# Patient Record
Sex: MALE | Race: WHITE | NOT HISPANIC OR LATINO | Employment: FULL TIME | URBAN - METROPOLITAN AREA
[De-identification: names, ages, dates, MRNs, and addresses within clinical notes are randomized per-mention and may not be internally consistent; named-entity substitution may affect disease eponyms.]

---

## 2019-03-28 ENCOUNTER — OFFICE VISIT (OUTPATIENT)
Dept: URGENT CARE | Facility: CLINIC | Age: 34
End: 2019-03-28
Payer: COMMERCIAL

## 2019-03-28 VITALS
BODY MASS INDEX: 30.8 KG/M2 | WEIGHT: 220 LBS | DIASTOLIC BLOOD PRESSURE: 124 MMHG | HEART RATE: 94 BPM | TEMPERATURE: 98 F | RESPIRATION RATE: 16 BRPM | HEIGHT: 71 IN | SYSTOLIC BLOOD PRESSURE: 186 MMHG

## 2019-03-28 DIAGNOSIS — Z02.83 ENCOUNTER FOR DRUG SCREENING: ICD-10-CM

## 2019-03-28 DIAGNOSIS — I10 ELEVATED BLOOD PRESSURE READING IN OFFICE WITH DIAGNOSIS OF HYPERTENSION: ICD-10-CM

## 2019-03-28 DIAGNOSIS — S00.83XA CONTUSION OF FOREHEAD, INITIAL ENCOUNTER: Primary | ICD-10-CM

## 2019-03-28 LAB — BREATH ALCOHOL: 0

## 2019-03-28 PROCEDURE — 82075 ASSAY OF BREATH ETHANOL: CPT | Mod: S$GLB,,, | Performed by: PHYSICIAN ASSISTANT

## 2019-03-28 PROCEDURE — 82075 POCT BREATH ALCOHOL TEST: ICD-10-PCS | Mod: S$GLB,,, | Performed by: PHYSICIAN ASSISTANT

## 2019-03-28 PROCEDURE — 99203 PR OFFICE/OUTPT VISIT, NEW, LEVL III, 30-44 MIN: ICD-10-PCS | Mod: S$GLB,,, | Performed by: PHYSICIAN ASSISTANT

## 2019-03-28 PROCEDURE — 99203 OFFICE O/P NEW LOW 30 MIN: CPT | Mod: S$GLB,,, | Performed by: PHYSICIAN ASSISTANT

## 2019-03-28 NOTE — LETTER
[facility]  [facility] [facility] [place], [address]  [facility] [address]  Phone: [phone]  Fax: [phone]  [facility]: [email or web address]    Pt Name: Charly Duran Date: 03/28/2019   Employee ID: [id number] Date of Treatment: 03/28/2019   Company: [employer]      Appointment Time:  Arrived: 11:55 AM   Provider: Graham Hatch PA-C Time Out:2:19 PM     Office Treatment:   1. Contusion of forehead, initial encounter    2. Encounter for drug screening    3. Elevated blood pressure reading in office with diagnosis of hypertension               Restrictions: None, Regular Duty     KD

## 2019-03-28 NOTE — PATIENT INSTRUCTIONS
Facial Contusion  A contusion is another word for a bruise. It happens when small blood vessels break open and leak blood into the nearby area. A facial contusion can result from a bump, hit, or fall. This may happen during sports or an accident. Symptoms of a contusion often include changes in skin color (bruising), swelling, and pain.   The swelling from the contusion should decrease in a few days. Bruising and pain may take several weeks to go away.   Home care  · If you have been prescribed medicines for pain, take them as directed.  · To help reduce swelling and pain, wrap a cold pack or bag of frozen peas in a thin towel. Put it on the injured area for up to 20 minutes. Do this a few times a day until the swelling goes down.   · If you have scrapes or cuts on your face requiring stiches or other closures, care for them as directed.  · For the next 24 hours (or longer if instructed):  ¨ Dont drink alcohol, or use sedatives or medicines that make you sleepy.  ¨ Dont drive or operate machinery.  ¨ Avoid doing anything strenuous. Dont lift or strain.  ¨ Do not return to sports or other activity that could result in another head injury.  Note about concussion  Because the injury was to your head, it is possible that a concussion (mild brain injury) could result. You don't have signs of a concussion at this time. But symptoms can show up later. Be alert for signs and symptoms of a concussion. Seek emergency medical care if any of these develop over the next hours to days:  · Headache  · Nausea or vomiting  · Dizziness  · Sensitivity to light or noise  · Unusual sleepiness or grogginess  · Trouble falling asleep  · Personality changes  · Vision changes  · Memory loss  · Confusion  · Trouble walking or clumsiness  · Loss of consciousness (even for a short time)  · Inability to be awakened   Follow-up care  Follow up with your healthcare provider or our staff as directed.  When to seek medical advice  Call your  healthcare provider right away if any of these occur:  · Swelling or pain that gets worse, not better  · New swelling or pain  · Warmth or drainage from the swollen area or from cuts or scrapes  · Fluid drainage or bleeding from the nose or ears  · Fever of 100.4ºF (38ºC) or higher, or as directed by your healthcare provider  Date Last Reviewed: 5/7/2015  © 8178-2853 [employer]. [address] [place] [address], [place] [id number]. All rights reserved. This information is not intended as a substitute for professional medical care. Always follow your healthcare professional's instructions.

## 2019-03-28 NOTE — PROGRESS NOTES
Subjective:       Patient ID: Charly Pires is a 33 y.o. male.    Chief Complaint: Head Injury and Drug / Alcohol Assessment    Pt is being seen today for a head injury from 03/28/19.  He hit his head on a dumpster door.  He has a headache, feels tired, double vision, and soreness.  His pain level today 4/10.  Vision (corrected)  Both eyes:  20/20  Left eye:  20/25 Right eye:  20/25.  KD    Patient endorses mild headache, denies loss of consciousness, tinnitus, speech problems, focal weakness.    Review of Systems   Constitution: Negative for malaise/fatigue.   HENT: Negative for hearing loss and tinnitus.    Eyes: Positive for blurred vision and double vision. Negative for visual disturbance.   Cardiovascular: Negative for chest pain.   Respiratory: Negative for shortness of breath.    Hematologic/Lymphatic: Negative for bleeding problem.   Skin: Negative for color change and poor wound healing.   Musculoskeletal: Negative for back pain and neck pain.   Gastrointestinal: Negative for abdominal pain, nausea and vomiting.   Neurological: Positive for dizziness and headaches. Negative for difficulty with concentration, disturbances in coordination, focal weakness, seizures and weakness.   Psychiatric/Behavioral: Negative for altered mental status (Denies LOC) and memory loss.   All other systems reviewed and are negative.      Objective:      Physical Exam   Constitutional: He is oriented to person, place, and time. He appears well-developed and well-nourished. He is cooperative.  Non-toxic appearance. He does not appear ill. No distress.   HENT:   Head: Normocephalic. Head is with contusion (Mid forehead). Head is without raccoon's eyes, without Childress's sign and without abrasion.       Right Ear: Hearing, tympanic membrane, external ear and ear canal normal.   Left Ear: Hearing, tympanic membrane, external ear and ear canal normal.   Nose: Nose normal. No mucosal edema, rhinorrhea or nasal deformity. No  epistaxis. Right sinus exhibits no maxillary sinus tenderness and no frontal sinus tenderness. Left sinus exhibits no maxillary sinus tenderness and no frontal sinus tenderness.   Mouth/Throat: Uvula is midline, oropharynx is clear and moist and mucous membranes are normal. No trismus in the jaw. Normal dentition. No uvula swelling. No posterior oropharyngeal erythema.   No temporal TTP   Eyes: Pupils are equal, round, and reactive to light. Conjunctivae, EOM and lids are normal. No scleral icterus.   Sclera clear bilat   Neck: Trachea normal, normal range of motion, full passive range of motion without pain and phonation normal. Neck supple. No neck rigidity.   Cardiovascular: Normal rate, regular rhythm, normal heart sounds, intact distal pulses and normal pulses.   Pulmonary/Chest: Effort normal and breath sounds normal. No respiratory distress.   Abdominal: Soft. Normal appearance and bowel sounds are normal. He exhibits no distension. There is no tenderness.   Musculoskeletal: Normal range of motion. He exhibits no edema or deformity.   Neurological: He is alert and oriented to person, place, and time. He has normal strength. No cranial nerve deficit. He exhibits normal muscle tone. He displays a negative Romberg sign. Coordination (Rapid alternating movements, finger to nose normal bilaterally) and gait ( heel, toe, tandem gait normal) normal. GCS eye subscore is 4. GCS verbal subscore is 5. GCS motor subscore is 6.   Wall head CT score = 0   Skin: Skin is warm, dry and intact. He is not diaphoretic. No pallor.   Psychiatric: He has a normal mood and affect. His speech is normal and behavior is normal. Judgment and thought content normal. Cognition and memory are normal.   Nursing note and vitals reviewed.      Assessment:       1. Contusion of forehead, initial encounter    2. Encounter for drug screening    3. Elevated blood pressure reading in office with diagnosis of hypertension        Plan:                 Restrictions: Regular Duty  Follow up if symptoms worsen or fail to improve.      Discussed follow-up with primary care for blood pressure management.  Patient was given information for free clinics in the [facility] area.      Patient Instructions       Facial Contusion  A contusion is another word for a bruise. It happens when small blood vessels break open and leak blood into the nearby area. A facial contusion can result from a bump, hit, or fall. This may happen during sports or an accident. Symptoms of a contusion often include changes in skin color (bruising), swelling, and pain.   The swelling from the contusion should decrease in a few days. Bruising and pain may take several weeks to go away.   Home care  · If you have been prescribed medicines for pain, take them as directed.  · To help reduce swelling and pain, wrap a cold pack or bag of frozen peas in a thin towel. Put it on the injured area for up to 20 minutes. Do this a few times a day until the swelling goes down.   · If you have scrapes or cuts on your face requiring stiches or other closures, care for them as directed.  · For the next 24 hours (or longer if instructed):  ¨ Dont drink alcohol, or use sedatives or medicines that make you sleepy.  ¨ Dont drive or operate machinery.  ¨ Avoid doing anything strenuous. Dont lift or strain.  ¨ Do not return to sports or other activity that could result in another head injury.  Note about concussion  Because the injury was to your head, it is possible that a concussion (mild brain injury) could result. You don't have signs of a concussion at this time. But symptoms can show up later. Be alert for signs and symptoms of a concussion. Seek emergency medical care if any of these develop over the next hours to days:  · Headache  · Nausea or vomiting  · Dizziness  · Sensitivity to light or noise  · Unusual sleepiness or grogginess  · Trouble falling asleep  · Personality changes  · Vision changes  · Memory  loss  · Confusion  · Trouble walking or clumsiness  · Loss of consciousness (even for a short time)  · Inability to be awakened   Follow-up care  Follow up with your healthcare provider or our staff as directed.  When to seek medical advice  Call your healthcare provider right away if any of these occur:  · Swelling or pain that gets worse, not better  · New swelling or pain  · Warmth or drainage from the swollen area or from cuts or scrapes  · Fluid drainage or bleeding from the nose or ears  · Fever of 100.4ºF (38ºC) or higher, or as directed by your healthcare provider  Date Last Reviewed: 5/7/2015  © 1893-0454 [employer]. [address] [place] [address], [place] [address]. All rights reserved. This information is not intended as a substitute for professional medical care. Always follow your healthcare professional's instructions.

## 2021-10-08 ENCOUNTER — HOSPITAL ENCOUNTER (EMERGENCY)
Facility: HOSPITAL | Age: 36
Discharge: HOME | End: 2021-10-08
Attending: EMERGENCY MEDICINE
Payer: COMMERCIAL

## 2021-10-08 VITALS
SYSTOLIC BLOOD PRESSURE: 143 MMHG | HEART RATE: 97 BPM | BODY MASS INDEX: 29.4 KG/M2 | DIASTOLIC BLOOD PRESSURE: 92 MMHG | RESPIRATION RATE: 17 BRPM | HEIGHT: 71 IN | WEIGHT: 210 LBS | OXYGEN SATURATION: 99 % | TEMPERATURE: 98 F

## 2021-10-08 DIAGNOSIS — I10 HYPERTENSION: ICD-10-CM

## 2021-10-08 DIAGNOSIS — I10 HYPERTENSION, UNSPECIFIED TYPE: Primary | ICD-10-CM

## 2021-10-08 LAB
ALBUMIN SERPL BCP-MCNC: 4.3 G/DL (ref 3.5–5.2)
ALP SERPL-CCNC: 55 U/L (ref 55–135)
ALT SERPL W/O P-5'-P-CCNC: 28 U/L (ref 10–44)
ANION GAP SERPL CALC-SCNC: 7 MMOL/L (ref 8–16)
AST SERPL-CCNC: 18 U/L (ref 10–40)
BASOPHILS # BLD AUTO: 0.06 K/UL (ref 0–0.2)
BASOPHILS NFR BLD: 0.8 % (ref 0–1.9)
BILIRUB SERPL-MCNC: 1.3 MG/DL (ref 0.1–1)
BUN SERPL-MCNC: 20 MG/DL (ref 6–20)
CALCIUM SERPL-MCNC: 9.2 MG/DL (ref 8.7–10.5)
CHLORIDE SERPL-SCNC: 101 MMOL/L (ref 95–110)
CO2 SERPL-SCNC: 29 MMOL/L (ref 23–29)
CREAT SERPL-MCNC: 1 MG/DL (ref 0.5–1.4)
D DIMER PPP IA.FEU-MCNC: <0.27 UG/ML FEU
DIFFERENTIAL METHOD: ABNORMAL
EOSINOPHIL # BLD AUTO: 0.1 K/UL (ref 0–0.5)
EOSINOPHIL NFR BLD: 1.1 % (ref 0–8)
ERYTHROCYTE [DISTWIDTH] IN BLOOD BY AUTOMATED COUNT: 11.9 % (ref 11.5–14.5)
EST. GFR  (AFRICAN AMERICAN): >60 ML/MIN/1.73 M^2
EST. GFR  (NON AFRICAN AMERICAN): >60 ML/MIN/1.73 M^2
GLUCOSE SERPL-MCNC: 124 MG/DL (ref 70–110)
HCT VFR BLD AUTO: 38.5 % (ref 40–54)
HGB BLD-MCNC: 13.5 G/DL (ref 14–18)
IMM GRANULOCYTES # BLD AUTO: 0.03 K/UL (ref 0–0.04)
IMM GRANULOCYTES NFR BLD AUTO: 0.4 % (ref 0–0.5)
LYMPHOCYTES # BLD AUTO: 1.7 K/UL (ref 1–4.8)
LYMPHOCYTES NFR BLD: 23.4 % (ref 18–48)
MCH RBC QN AUTO: 32 PG (ref 27–31)
MCHC RBC AUTO-ENTMCNC: 35.1 G/DL (ref 32–36)
MCV RBC AUTO: 91 FL (ref 82–98)
MONOCYTES # BLD AUTO: 0.5 K/UL (ref 0.3–1)
MONOCYTES NFR BLD: 6.8 % (ref 4–15)
NEUTROPHILS # BLD AUTO: 4.9 K/UL (ref 1.8–7.7)
NEUTROPHILS NFR BLD: 67.5 % (ref 38–73)
NRBC BLD-RTO: 0 /100 WBC
PLATELET # BLD AUTO: 229 K/UL (ref 150–450)
PMV BLD AUTO: 10.5 FL (ref 9.2–12.9)
POTASSIUM SERPL-SCNC: 3.8 MMOL/L (ref 3.5–5.1)
PROT SERPL-MCNC: 6.7 G/DL (ref 6–8.4)
RBC # BLD AUTO: 4.22 M/UL (ref 4.6–6.2)
SODIUM SERPL-SCNC: 137 MMOL/L (ref 136–145)
TROPONIN I SERPL DL<=0.01 NG/ML-MCNC: <0.03 NG/ML
TSH SERPL DL<=0.005 MIU/L-ACNC: 0.84 UIU/ML (ref 0.34–5.6)
WBC # BLD AUTO: 7.22 K/UL (ref 3.9–12.7)

## 2021-10-08 PROCEDURE — 84443 ASSAY THYROID STIM HORMONE: CPT | Performed by: EMERGENCY MEDICINE

## 2021-10-08 PROCEDURE — 85379 FIBRIN DEGRADATION QUANT: CPT | Performed by: EMERGENCY MEDICINE

## 2021-10-08 PROCEDURE — 93005 ELECTROCARDIOGRAM TRACING: CPT | Performed by: INTERNAL MEDICINE

## 2021-10-08 PROCEDURE — 85025 COMPLETE CBC W/AUTO DIFF WBC: CPT | Performed by: EMERGENCY MEDICINE

## 2021-10-08 PROCEDURE — 99284 EMERGENCY DEPT VISIT MOD MDM: CPT | Mod: 25

## 2021-10-08 PROCEDURE — 80053 COMPREHEN METABOLIC PANEL: CPT | Performed by: EMERGENCY MEDICINE

## 2021-10-08 PROCEDURE — 93010 EKG 12-LEAD: ICD-10-PCS | Mod: ,,, | Performed by: INTERNAL MEDICINE

## 2021-10-08 PROCEDURE — 84484 ASSAY OF TROPONIN QUANT: CPT | Performed by: EMERGENCY MEDICINE

## 2021-10-08 PROCEDURE — 93010 ELECTROCARDIOGRAM REPORT: CPT | Mod: ,,, | Performed by: INTERNAL MEDICINE

## 2021-10-08 RX ORDER — HYDROCHLOROTHIAZIDE 25 MG/1
25 TABLET ORAL DAILY
Qty: 30 TABLET | Refills: 0 | Status: SHIPPED | OUTPATIENT
Start: 2021-10-08 | End: 2021-10-11 | Stop reason: SDUPTHER

## 2021-10-09 ENCOUNTER — HOSPITAL ENCOUNTER (EMERGENCY)
Facility: HOSPITAL | Age: 36
Discharge: HOME | End: 2021-10-09
Attending: EMERGENCY MEDICINE
Payer: COMMERCIAL

## 2021-10-09 VITALS
WEIGHT: 210 LBS | OXYGEN SATURATION: 98 % | TEMPERATURE: 99 F | HEIGHT: 71 IN | SYSTOLIC BLOOD PRESSURE: 177 MMHG | BODY MASS INDEX: 29.4 KG/M2 | RESPIRATION RATE: 16 BRPM | HEART RATE: 100 BPM | DIASTOLIC BLOOD PRESSURE: 112 MMHG

## 2021-10-09 DIAGNOSIS — I10 HYPERTENSION: Primary | ICD-10-CM

## 2021-10-09 DIAGNOSIS — F41.9 ANXIETY: ICD-10-CM

## 2021-10-09 PROCEDURE — 93005 ELECTROCARDIOGRAM TRACING: CPT | Performed by: INTERNAL MEDICINE

## 2021-10-09 PROCEDURE — 93010 EKG 12-LEAD: ICD-10-PCS | Mod: ,,, | Performed by: INTERNAL MEDICINE

## 2021-10-09 PROCEDURE — 93010 ELECTROCARDIOGRAM REPORT: CPT | Mod: ,,, | Performed by: INTERNAL MEDICINE

## 2021-10-09 PROCEDURE — 99282 EMERGENCY DEPT VISIT SF MDM: CPT

## 2021-10-09 RX ORDER — HYDROXYZINE PAMOATE 25 MG/1
25 CAPSULE ORAL EVERY 8 HOURS PRN
Qty: 20 CAPSULE | Refills: 0 | Status: SHIPPED | OUTPATIENT
Start: 2021-10-09 | End: 2021-10-19

## 2021-10-11 ENCOUNTER — TELEPHONE (OUTPATIENT)
Dept: FAMILY MEDICINE | Facility: CLINIC | Age: 36
End: 2021-10-11

## 2021-10-11 ENCOUNTER — OFFICE VISIT (OUTPATIENT)
Dept: FAMILY MEDICINE | Facility: CLINIC | Age: 36
End: 2021-10-11
Payer: COMMERCIAL

## 2021-10-11 VITALS
WEIGHT: 207.69 LBS | TEMPERATURE: 99 F | HEART RATE: 104 BPM | RESPIRATION RATE: 18 BRPM | OXYGEN SATURATION: 96 % | HEIGHT: 71 IN | BODY MASS INDEX: 29.08 KG/M2 | DIASTOLIC BLOOD PRESSURE: 62 MMHG | SYSTOLIC BLOOD PRESSURE: 99 MMHG

## 2021-10-11 DIAGNOSIS — G25.81 RLS (RESTLESS LEGS SYNDROME): ICD-10-CM

## 2021-10-11 DIAGNOSIS — G47.33 OBSTRUCTIVE SLEEP APNEA SYNDROME: ICD-10-CM

## 2021-10-11 DIAGNOSIS — R17 ELEVATED BILIRUBIN: ICD-10-CM

## 2021-10-11 DIAGNOSIS — R42 DIZZINESS: Primary | ICD-10-CM

## 2021-10-11 DIAGNOSIS — I10 ESSENTIAL HYPERTENSION: ICD-10-CM

## 2021-10-11 DIAGNOSIS — R00.0 TACHYCARDIA: ICD-10-CM

## 2021-10-11 DIAGNOSIS — F41.9 ANXIETY: ICD-10-CM

## 2021-10-11 DIAGNOSIS — D64.9 ANEMIA, UNSPECIFIED TYPE: ICD-10-CM

## 2021-10-11 DIAGNOSIS — Z11.4 SCREENING FOR HIV WITHOUT PRESENCE OF RISK FACTORS: ICD-10-CM

## 2021-10-11 DIAGNOSIS — Z13.6 ENCOUNTER FOR LIPID SCREENING FOR CARDIOVASCULAR DISEASE: ICD-10-CM

## 2021-10-11 DIAGNOSIS — Z13.220 ENCOUNTER FOR LIPID SCREENING FOR CARDIOVASCULAR DISEASE: ICD-10-CM

## 2021-10-11 LAB
EKG 12-LEAD: NORMAL
GLUCOSE SERPL-MCNC: 138 MG/DL (ref 70–110)
HBA1C MFR BLD: 5.6 %
PR INTERVAL: NORMAL
PRT AXES: NORMAL
QRS DURATION: NORMAL
QT/QTC: NORMAL
VENTRICULAR RATE: NORMAL

## 2021-10-11 PROCEDURE — 3044F HG A1C LEVEL LT 7.0%: CPT | Mod: S$GLB,,, | Performed by: NURSE PRACTITIONER

## 2021-10-11 PROCEDURE — 3078F DIAST BP <80 MM HG: CPT | Mod: S$GLB,,, | Performed by: NURSE PRACTITIONER

## 2021-10-11 PROCEDURE — 3008F BODY MASS INDEX DOCD: CPT | Mod: S$GLB,,, | Performed by: NURSE PRACTITIONER

## 2021-10-11 PROCEDURE — 3044F PR MOST RECENT HEMOGLOBIN A1C LEVEL <7.0%: ICD-10-PCS | Mod: S$GLB,,, | Performed by: NURSE PRACTITIONER

## 2021-10-11 PROCEDURE — 3078F PR MOST RECENT DIASTOLIC BLOOD PRESSURE < 80 MM HG: ICD-10-PCS | Mod: S$GLB,,, | Performed by: NURSE PRACTITIONER

## 2021-10-11 PROCEDURE — 93000 ELECTROCARDIOGRAM COMPLETE: CPT | Mod: S$GLB,,, | Performed by: NURSE PRACTITIONER

## 2021-10-11 PROCEDURE — 1160F PR REVIEW ALL MEDS BY PRESCRIBER/CLIN PHARMACIST DOCUMENTED: ICD-10-PCS | Mod: S$GLB,,, | Performed by: NURSE PRACTITIONER

## 2021-10-11 PROCEDURE — 93000 POCT EKG 12-LEAD: ICD-10-PCS | Mod: S$GLB,,, | Performed by: NURSE PRACTITIONER

## 2021-10-11 PROCEDURE — 99204 PR OFFICE/OUTPT VISIT, NEW, LEVL IV, 45-59 MIN: ICD-10-PCS | Mod: 25,S$GLB,, | Performed by: NURSE PRACTITIONER

## 2021-10-11 PROCEDURE — 82962 GLUCOSE BLOOD TEST: CPT | Mod: S$GLB,,, | Performed by: NURSE PRACTITIONER

## 2021-10-11 PROCEDURE — 3074F SYST BP LT 130 MM HG: CPT | Mod: S$GLB,,, | Performed by: NURSE PRACTITIONER

## 2021-10-11 PROCEDURE — 3074F PR MOST RECENT SYSTOLIC BLOOD PRESSURE < 130 MM HG: ICD-10-PCS | Mod: S$GLB,,, | Performed by: NURSE PRACTITIONER

## 2021-10-11 PROCEDURE — 83036 HEMOGLOBIN GLYCOSYLATED A1C: CPT | Mod: QW,S$GLB,, | Performed by: NURSE PRACTITIONER

## 2021-10-11 PROCEDURE — 3008F PR BODY MASS INDEX (BMI) DOCUMENTED: ICD-10-PCS | Mod: S$GLB,,, | Performed by: NURSE PRACTITIONER

## 2021-10-11 PROCEDURE — 83036 POCT HEMOGLOBIN A1C: ICD-10-PCS | Mod: QW,S$GLB,, | Performed by: NURSE PRACTITIONER

## 2021-10-11 PROCEDURE — 82962 POCT GLUCOSE, HAND-HELD DEVICE: ICD-10-PCS | Mod: S$GLB,,, | Performed by: NURSE PRACTITIONER

## 2021-10-11 PROCEDURE — 99204 OFFICE O/P NEW MOD 45 MIN: CPT | Mod: 25,S$GLB,, | Performed by: NURSE PRACTITIONER

## 2021-10-11 PROCEDURE — 1160F RVW MEDS BY RX/DR IN RCRD: CPT | Mod: S$GLB,,, | Performed by: NURSE PRACTITIONER

## 2021-10-11 RX ORDER — HYDROCHLOROTHIAZIDE 25 MG/1
25 TABLET ORAL DAILY
Qty: 90 TABLET | Refills: 1 | Status: SHIPPED | OUTPATIENT
Start: 2021-10-11 | End: 2022-10-11

## 2021-10-11 RX ORDER — PRAMIPEXOLE DIHYDROCHLORIDE 0.12 MG/1
0.12 TABLET ORAL NIGHTLY
Qty: 30 TABLET | Refills: 1 | Status: SHIPPED | OUTPATIENT
Start: 2021-10-11 | End: 2022-10-11

## 2021-10-11 RX ORDER — METOPROLOL SUCCINATE 25 MG/1
25 TABLET, EXTENDED RELEASE ORAL NIGHTLY
Qty: 30 TABLET | Refills: 1 | Status: SHIPPED | OUTPATIENT
Start: 2021-10-11 | End: 2021-10-11

## 2021-10-12 ENCOUNTER — TELEPHONE (OUTPATIENT)
Dept: PSYCHIATRY | Facility: CLINIC | Age: 36
End: 2021-10-12

## 2021-10-13 ENCOUNTER — PATIENT MESSAGE (OUTPATIENT)
Dept: FAMILY MEDICINE | Facility: CLINIC | Age: 36
End: 2021-10-13
Payer: COMMERCIAL

## 2021-10-13 ENCOUNTER — TELEPHONE (OUTPATIENT)
Dept: FAMILY MEDICINE | Facility: CLINIC | Age: 36
End: 2021-10-13

## 2021-10-13 LAB
ALBUMIN SERPL-MCNC: 4.7 G/DL (ref 3.6–5.1)
ALBUMIN/CREAT UR: 14 MCG/MG CREAT
ALBUMIN/GLOB SERPL: 2.1 (CALC) (ref 1–2.5)
ALP SERPL-CCNC: 71 U/L (ref 36–130)
ALT SERPL-CCNC: 27 U/L (ref 9–46)
AST SERPL-CCNC: 20 U/L (ref 10–40)
BASOPHILS # BLD AUTO: 79 CELLS/UL (ref 0–200)
BASOPHILS NFR BLD AUTO: 0.9 %
BILIRUB DIRECT SERPL-MCNC: 0.1 MG/DL
BILIRUB INDIRECT SERPL-MCNC: 0.7 MG/DL (CALC) (ref 0.2–1.2)
BILIRUB SERPL-MCNC: 0.8 MG/DL (ref 0.2–1.2)
CHOLEST SERPL-MCNC: 164 MG/DL
CHOLEST/HDLC SERPL: 4.8 (CALC)
COMMENT: NORMAL
CREAT UR-MCNC: 128 MG/DL (ref 20–320)
EOSINOPHIL # BLD AUTO: 132 CELLS/UL (ref 15–500)
EOSINOPHIL NFR BLD AUTO: 1.5 %
ERYTHROCYTE [DISTWIDTH] IN BLOOD BY AUTOMATED COUNT: 12 % (ref 11–15)
FERRITIN SERPL-MCNC: 193 NG/ML (ref 38–380)
GLOBULIN SER CALC-MCNC: 2.2 G/DL (CALC) (ref 1.9–3.7)
GLUCOSE P FAST SERPL-MCNC: 130 MG/DL (ref 65–99)
HAV IGM SERPL QL IA: NORMAL
HBA1C MFR BLD: 5.3 % OF TOTAL HGB
HBV CORE IGM SERPL QL IA: NORMAL
HBV SURFACE AG SERPL QL IA: NORMAL
HCT VFR BLD AUTO: 44.8 % (ref 38.5–50)
HCV AB S/CO SERPL IA: 0.01
HCV AB SERPL QL IA: NORMAL
HDLC SERPL-MCNC: 34 MG/DL
HGB BLD-MCNC: 15 G/DL (ref 13.2–17.1)
HIV 1+2 AB+HIV1 P24 AG SERPL QL IA: NORMAL
IRON SATN MFR SERPL: 41 % (CALC) (ref 20–48)
IRON SERPL-MCNC: 153 MCG/DL (ref 50–180)
LDLC SERPL CALC-MCNC: 87 MG/DL (CALC)
LYMPHOCYTES # BLD AUTO: 3758 CELLS/UL (ref 850–3900)
LYMPHOCYTES NFR BLD AUTO: 42.7 %
MCH RBC QN AUTO: 31.3 PG (ref 27–33)
MCHC RBC AUTO-ENTMCNC: 33.5 G/DL (ref 32–36)
MCV RBC AUTO: 93.3 FL (ref 80–100)
MICROALBUMIN UR-MCNC: 1.8 MG/DL
MONOCYTES # BLD AUTO: 572 CELLS/UL (ref 200–950)
MONOCYTES NFR BLD AUTO: 6.5 %
NEUTROPHILS # BLD AUTO: 4259 CELLS/UL (ref 1500–7800)
NEUTROPHILS NFR BLD AUTO: 48.4 %
NONHDLC SERPL-MCNC: 130 MG/DL (CALC)
PLATELET # BLD AUTO: 263 THOUSAND/UL (ref 140–400)
PMV BLD REES-ECKER: 10.8 FL (ref 7.5–12.5)
PROT SERPL-MCNC: 6.9 G/DL (ref 6.1–8.1)
RBC # BLD AUTO: 4.8 MILLION/UL (ref 4.2–5.8)
TIBC SERPL-MCNC: 371 MCG/DL (CALC) (ref 250–425)
TRIGL SERPL-MCNC: 319 MG/DL
VIT B12 SERPL-MCNC: 664 PG/ML (ref 200–1100)
WBC # BLD AUTO: 8.8 THOUSAND/UL (ref 3.8–10.8)

## 2021-10-20 ENCOUNTER — TELEPHONE (OUTPATIENT)
Dept: PSYCHIATRY | Facility: CLINIC | Age: 36
End: 2021-10-20

## 2021-11-04 DIAGNOSIS — G47.33 OSA (OBSTRUCTIVE SLEEP APNEA): Primary | ICD-10-CM

## 2021-11-04 DIAGNOSIS — R06.83 SNORING: ICD-10-CM

## 2021-11-04 DIAGNOSIS — G47.19 EXCESSIVE DAYTIME SLEEPINESS: ICD-10-CM

## 2021-11-04 DIAGNOSIS — R53.83 LOSS OF ENERGY: ICD-10-CM

## 2021-11-04 DIAGNOSIS — I10 HYPERTENSION: ICD-10-CM

## 2021-11-08 ENCOUNTER — PATIENT MESSAGE (OUTPATIENT)
Dept: FAMILY MEDICINE | Facility: CLINIC | Age: 36
End: 2021-11-08
Payer: COMMERCIAL

## 2021-11-08 ENCOUNTER — TELEPHONE (OUTPATIENT)
Dept: PSYCHIATRY | Facility: CLINIC | Age: 36
End: 2021-11-08
Payer: COMMERCIAL

## 2021-11-16 ENCOUNTER — PROCEDURE VISIT (OUTPATIENT)
Dept: SLEEP MEDICINE | Facility: HOSPITAL | Age: 36
End: 2021-11-16
Attending: INTERNAL MEDICINE
Payer: COMMERCIAL

## 2021-11-16 DIAGNOSIS — G47.19 EXCESSIVE DAYTIME SLEEPINESS: ICD-10-CM

## 2021-11-16 DIAGNOSIS — R06.83 SNORING: ICD-10-CM

## 2021-11-16 DIAGNOSIS — R53.83 LOSS OF ENERGY: ICD-10-CM

## 2021-11-16 DIAGNOSIS — I10 HYPERTENSION: ICD-10-CM

## 2021-11-16 DIAGNOSIS — G47.33 OSA (OBSTRUCTIVE SLEEP APNEA): ICD-10-CM

## 2021-11-16 PROCEDURE — 95806 SLEEP STUDY UNATT&RESP EFFT: CPT

## 2021-12-06 ENCOUNTER — OFFICE VISIT (OUTPATIENT)
Dept: PSYCHIATRY | Facility: CLINIC | Age: 36
End: 2021-12-06
Payer: COMMERCIAL

## 2021-12-06 VITALS
BODY MASS INDEX: 30.88 KG/M2 | SYSTOLIC BLOOD PRESSURE: 182 MMHG | WEIGHT: 220.56 LBS | HEIGHT: 71 IN | DIASTOLIC BLOOD PRESSURE: 118 MMHG | HEART RATE: 124 BPM

## 2021-12-06 DIAGNOSIS — F41.0 PANIC ATTACKS: ICD-10-CM

## 2021-12-06 DIAGNOSIS — G47.09 OTHER INSOMNIA: Primary | ICD-10-CM

## 2021-12-06 PROCEDURE — 3061F NEG MICROALBUMINURIA REV: CPT | Mod: CPTII,S$GLB,,

## 2021-12-06 PROCEDURE — 99999 PR PBB SHADOW E&M-EST. PATIENT-LVL III: ICD-10-PCS | Mod: PBBFAC,,,

## 2021-12-06 PROCEDURE — 3066F PR DOCUMENTATION OF TREATMENT FOR NEPHROPATHY: ICD-10-PCS | Mod: CPTII,S$GLB,,

## 2021-12-06 PROCEDURE — 90792 PR PSYCHIATRIC DIAGNOSTIC EVALUATION W/MEDICAL SERVICES: ICD-10-PCS | Mod: S$GLB,,,

## 2021-12-06 PROCEDURE — 3066F NEPHROPATHY DOC TX: CPT | Mod: CPTII,S$GLB,,

## 2021-12-06 PROCEDURE — 90792 PSYCH DIAG EVAL W/MED SRVCS: CPT | Mod: S$GLB,,,

## 2021-12-06 PROCEDURE — 99999 PR PBB SHADOW E&M-EST. PATIENT-LVL III: CPT | Mod: PBBFAC,,,

## 2021-12-06 PROCEDURE — 3061F PR NEG MICROALBUMINURIA RESULT DOCUMENTED/REVIEW: ICD-10-PCS | Mod: CPTII,S$GLB,,

## 2021-12-06 RX ORDER — FLUTICASONE PROPIONATE 50 MCG
50 SPRAY, SUSPENSION (ML) NASAL 2 TIMES DAILY
COMMUNITY
Start: 2021-11-01

## 2021-12-06 RX ORDER — MIRTAZAPINE 15 MG/1
15 TABLET, FILM COATED ORAL NIGHTLY
Qty: 30 TABLET | Refills: 11 | Status: SHIPPED | OUTPATIENT
Start: 2021-12-06 | End: 2022-02-09

## 2021-12-06 RX ORDER — METOPROLOL SUCCINATE 25 MG/1
25 TABLET, EXTENDED RELEASE ORAL DAILY
COMMUNITY
Start: 2021-10-29 | End: 2022-01-07 | Stop reason: SDUPTHER

## 2021-12-16 ENCOUNTER — PATIENT MESSAGE (OUTPATIENT)
Dept: PSYCHIATRY | Facility: CLINIC | Age: 36
End: 2021-12-16
Payer: COMMERCIAL

## 2022-01-05 ENCOUNTER — OFFICE VISIT (OUTPATIENT)
Dept: PSYCHIATRY | Facility: CLINIC | Age: 37
End: 2022-01-05
Payer: COMMERCIAL

## 2022-01-05 VITALS
BODY MASS INDEX: 31.25 KG/M2 | WEIGHT: 223.19 LBS | SYSTOLIC BLOOD PRESSURE: 175 MMHG | HEART RATE: 104 BPM | HEIGHT: 71 IN | DIASTOLIC BLOOD PRESSURE: 115 MMHG

## 2022-01-05 DIAGNOSIS — G47.09 OTHER INSOMNIA: Primary | ICD-10-CM

## 2022-01-05 DIAGNOSIS — F41.0 PANIC ATTACKS: ICD-10-CM

## 2022-01-05 PROCEDURE — 3080F DIAST BP >= 90 MM HG: CPT | Mod: CPTII,S$GLB,,

## 2022-01-05 PROCEDURE — 3008F BODY MASS INDEX DOCD: CPT | Mod: CPTII,S$GLB,,

## 2022-01-05 PROCEDURE — 3077F PR MOST RECENT SYSTOLIC BLOOD PRESSURE >= 140 MM HG: ICD-10-PCS | Mod: CPTII,S$GLB,,

## 2022-01-05 PROCEDURE — 1160F RVW MEDS BY RX/DR IN RCRD: CPT | Mod: CPTII,S$GLB,,

## 2022-01-05 PROCEDURE — 1160F PR REVIEW ALL MEDS BY PRESCRIBER/CLIN PHARMACIST DOCUMENTED: ICD-10-PCS | Mod: CPTII,S$GLB,,

## 2022-01-05 PROCEDURE — 3077F SYST BP >= 140 MM HG: CPT | Mod: CPTII,S$GLB,,

## 2022-01-05 PROCEDURE — 99213 OFFICE O/P EST LOW 20 MIN: CPT | Mod: S$GLB,,,

## 2022-01-05 PROCEDURE — 3008F PR BODY MASS INDEX (BMI) DOCUMENTED: ICD-10-PCS | Mod: CPTII,S$GLB,,

## 2022-01-05 PROCEDURE — 1159F PR MEDICATION LIST DOCUMENTED IN MEDICAL RECORD: ICD-10-PCS | Mod: CPTII,S$GLB,,

## 2022-01-05 PROCEDURE — 99213 PR OFFICE/OUTPT VISIT, EST, LEVL III, 20-29 MIN: ICD-10-PCS | Mod: S$GLB,,,

## 2022-01-05 PROCEDURE — 99999 PR PBB SHADOW E&M-EST. PATIENT-LVL III: CPT | Mod: PBBFAC,,,

## 2022-01-05 PROCEDURE — 3080F PR MOST RECENT DIASTOLIC BLOOD PRESSURE >= 90 MM HG: ICD-10-PCS | Mod: CPTII,S$GLB,,

## 2022-01-05 PROCEDURE — 1159F MED LIST DOCD IN RCRD: CPT | Mod: CPTII,S$GLB,,

## 2022-01-05 PROCEDURE — 99999 PR PBB SHADOW E&M-EST. PATIENT-LVL III: ICD-10-PCS | Mod: PBBFAC,,,

## 2022-01-05 RX ORDER — ZOLPIDEM TARTRATE 5 MG/1
5 TABLET ORAL NIGHTLY PRN
Qty: 30 TABLET | Refills: 0 | Status: SHIPPED | OUTPATIENT
Start: 2022-01-05 | End: 2022-07-06

## 2022-01-05 NOTE — PROGRESS NOTES
"Outpatient Psychiatry Follow-Up Visit (MD/NP)    1/5/2022      Clinical Status of Patient:  Outpatient (Ambulatory)    Chief Complaint:  Charly Pires is a 36 y.o. male who presents today for follow-up of insomnia and panic.  Met with patient.      Interval History and Content of Current Session:  Interim Events/Subjective Report/Content of Current Session:   Pt is a 36 y.o. male with past history of  who presents for follow up treatment. Pt established care with me on 12/6/21, where Pt met criteria for insomnia and panic attacks. Pt is currently taking Remeron 15 mg po q.h.s.   Meds tolerated well but did not provide good relief of symptoms.    Today, Pt notes symptoms are improved, as he has had no panic attacks in the interim. He reports he took mirtazapine 2-3 times and then discontinued it. He states the mirtazapine, "Calmed me down enough to fall asleep but I'm still waking up like every other hour." Pt reports it takes ~20 minutes to return to sleep upon awakening. He notes he is getting ~5-6 hours of sleep on average per night. He reports that he is using his CPAP machine for KRISHNA, but continues to wake up throughout the night. He states he had less difficulty with sleep when he weighed less, so he is currently on the keto diet to lose weight in the hope that this helps with sleep. Pt notes hypnagogic auditory hallucinations when previously taking Ambien, but states he would like to try this medication again because, "I need to get some sleep."      Depressive Disorder: DENIES all.   Anxiety Disorder: none.   Manic Disorder: DENIES all.   Psychotic Disorder: DENIES all.   Substance Use:  DENIES all.       Past Psychiatric hx: Pt. is a 35 y.o. male, with no past psychiatric hx presenting to the clinic for an initial evaluation and treatment. PMHx outlined below. Pt is currently taking no psychotropic medications. Pt notes past trials of ambien which caused strange dreams, seroquel which made him difficult " "to arouse the next day, and trazodone which was ineffective. Reports current symptoms of insomnia that began around 10/8/21 after Pt was seen in the ER 2 times in 2 days for elevated BP. He reports that on 10/8 he felt unwell at work and when he arrived home he experienced a panic attack with sweating and severe anxiety. He noted he tried to go to sleep but was unable so he checked his BP which was 242/170 at home and 256/182 at the ER.     He reports panic attacks beginning 3 years ago but states they mostly resolved and were never this bad before. He has approximately 1-2 panic attacks per month. He describes these panic attacks as, "I will be having a normal day then all of a sudden I would feel anxious and SOB and have to stand up and walk around." Notes these attacks usually last 15-20 minutes and only happen during the day. He denies depressed mood, anhedonia, unintentional appetite/weight changes, feelings of worthlessness, hopelessness, or guilt, difficulty concentrating, or recurrent thoughts of death or SI. He also denies anxiety at any time other than when he has an occasional panic attack and denies anxiety upon attempting to initiate sleep at night.     Pt states that after the visits to the ER in October, "Now I just dont sleep, everytime I fall asleep my body jerks and I wake up. My arms and legs are flailing, I am irritated, agitated, and restless. It's a very worrying uncomfortable feeling." He reports this feeling lasts 1-5 minutes upon awakening. He further states this insomnia does not happen every night, states, "Sometimes I will go a week where everything is fine but then it will happen again." He reports he will fall asleep for ~30 seconds to 1 minute but then will wake up. After the 3rd time this happens, he will stay up all night. He reports he has been drinking alcohol nightly in order to sleep. Pt reports these episodes are not similar to panic attacks and denies feelings of chest pain, " "palpitations, SOB, hot or cold sensations, numbness or tingling, feelings of "going crazy," etc. He reports a history of sleep apnea also, but states these episodes are "Not like that. I would wake up gasping for air when it was the sleep apnea that was waking me up." Reports he uses CPAP but has been unable to for the last 2 months.      Past Medical hx:   Past Medical History:   Diagnosis Date    Hypertension     Restless legs syndrome     Sleep apnea          Interim hx:  Medication changes last visit: start Remeron 15 mg po q.h.s.   Anxiety: denies panic attacks in the interim  Depression: denies     Denies suicidal/homicidal ideations.  Denies hopelessness/worthlessness.    Denies auditory/visual hallucinations      Alcohol/Drugs/Caffeine/Tobacco: No change in consumption    Review of Systems   · PSYCHIATRIC: Pertinant items are noted in the narrative.  · All other systems reviewed and found to be negative       Past Medical, Family and Social History: The patient's past medical, family and social history have been reviewed and updated as appropriate within the electronic medical record - see encounter notes.    Medications: mirtazapine 15 mg po q.h.s. PRN insomnia     Compliance: no    Side effects: None    Risk Parameters:  Patient reports no suicidal ideation  Patient reports no homicidal ideation  Patient reports no self-injurious behavior  Patient reports no violent behavior    Exam   Constitutional  Vitals:  Most recent vital signs, dated less than 90 days prior to this appointment, were reviewed.   Last 3 sets of Vitals    Vitals - 1 value per visit 10/11/2021 12/6/2021 1/5/2022   SYSTOLIC 99 182 175   DIASTOLIC 62 118 115   PULSE 104 124 104   TEMPERATURE 98.5 - -   RESPIRATIONS 18 - -   SPO2 96 - -   Weight (lb) 207.7 220.57 223.22   Weight (kg) 94.212 100.05 101.25   HEIGHT 5' 11" 5' 11" 5' 11"   BODY MASS INDEX 28.97 30.76 31.13   VISIT REPORT - - -   Pain Score  0 0 -          General:  " unremarkable, age appropriate     Musculoskeletal  Muscle Strength/Tone:  no rigidity, no flaccidity, no dystonia, no tic   Gait & Station:  non-ataxic     Psychiatric  Speech:  no latency; no press   Mood & Affect:  euthymic  congruent and appropriate   Thought Process:  normal and logical   Associations:  intact   Thought Content:  normal, no suicidality, no homicidality, delusions, or paranoia   Insight:  intact   Judgement: behavior is adequate to circumstances   Orientation:  grossly intact   Memory: intact for content of interview   Language: grossly intact   Attention Span & Concentration:  able to focus   Fund of Knowledge:  intact and appropriate to age and level of education     Assessment and Diagnosis   Status/Progress: Based on the examination today, the patient's problem(s) is/are improved.  New problems have not been presented today.   Co-morbidities are complicating management of the primary condition.  There are no active rule-out diagnoses for this patient at this time.     General Impression: Pt is a 36 y.o. male with past history of insomnia and panic attacks presenting today for follow up. He denies panic attacks in the interim, but states he is still unable to sleep through the night and is waking up ever 1-2 hours. Pt requests a trial of Ambien. Discussed R/B/SE with Pt. Also discussed the need for PRN use and informed Pt this would be a short course and that Ambien is not a long term medication. Pt verbalized understanding.    Ambien Initiation:  Patient advised of the risks, benefits, and common side effects of medication and has accepted informed consent.  Common side effects include drowsiness, impaired coordination, possible memory loss. Patient advised NOT to operate a vehicle or machinery until they are sure how the medication will affect them.  Client also advised of danger of mixing this medication with alcohol. Patient advised of potential addictive nature of medication and need to  safeguard medication as no early refills for lost or stolen medications can be authorized.      This patient's profile was checked on the [place] Prescription Monitoring Program. No evidence of misuse.    Safe for outpatient follow up and no acute safety concerns.    Encounter Diagnoses   Name Primary?    Panic attacks     Other insomnia Yes         Intervention/Counseling/Treatment Plan   · Medication Management: The risks and benefits of medication were discussed with the patient. Shared decision making occurred   · The treatment plan and follow up plan were reviewed with the patient.   · Discontinue mirtazapine 15 mg po q.h.s. PRN insomnia  · Start Ambien 5 mg po q.h.s. PRN insomnia  · Continue to practice good sleep hygiene  · Offered referral for individual psychotherapy.  · Call to report any worsening of symptoms or problems with the medication. Pt instructed to go to ER with thoughts of harming self, others  · Labs: no new orders      Return to Clinic: 1 month    -Pt given contact number for psychotherapists at [facility] ans also instructed they may check with insurance for a list of providers  -Spent 30min face to face with the pt; >50% time spent in counseling   -Supportive therapy and psychoeducation provided  -R/B/SE's of medications discussed with the pt who expresses understanding and chooses to take medications as prescribed.   -Pt instructed to call clinic, 911 or go to nearest emergency room if sxs worsen or pt is in   crisis. The pt expresses understanding.    Gloria Medina

## 2022-01-06 ENCOUNTER — HOSPITAL ENCOUNTER (EMERGENCY)
Facility: HOSPITAL | Age: 37
Discharge: HOME | End: 2022-01-07
Attending: EMERGENCY MEDICINE
Payer: COMMERCIAL

## 2022-01-06 DIAGNOSIS — I10 HYPERTENSION: ICD-10-CM

## 2022-01-06 DIAGNOSIS — I10 HTN (HYPERTENSION): ICD-10-CM

## 2022-01-06 LAB
BASOPHILS # BLD AUTO: 0.09 K/UL (ref 0–0.2)
BASOPHILS NFR BLD: 0.7 % (ref 0–1.9)
DIFFERENTIAL METHOD: ABNORMAL
EOSINOPHIL # BLD AUTO: 0.1 K/UL (ref 0–0.5)
EOSINOPHIL NFR BLD: 0.6 % (ref 0–8)
ERYTHROCYTE [DISTWIDTH] IN BLOOD BY AUTOMATED COUNT: 11.9 % (ref 11.5–14.5)
HCT VFR BLD AUTO: 45.6 % (ref 40–54)
HGB BLD-MCNC: 16.1 G/DL (ref 14–18)
IMM GRANULOCYTES # BLD AUTO: 0.05 K/UL (ref 0–0.04)
IMM GRANULOCYTES NFR BLD AUTO: 0.4 % (ref 0–0.5)
LYMPHOCYTES # BLD AUTO: 3.7 K/UL (ref 1–4.8)
LYMPHOCYTES NFR BLD: 29.7 % (ref 18–48)
MCH RBC QN AUTO: 31.6 PG (ref 27–31)
MCHC RBC AUTO-ENTMCNC: 35.3 G/DL (ref 32–36)
MCV RBC AUTO: 89 FL (ref 82–98)
MONOCYTES # BLD AUTO: 1.1 K/UL (ref 0.3–1)
MONOCYTES NFR BLD: 8.7 % (ref 4–15)
NEUTROPHILS # BLD AUTO: 7.5 K/UL (ref 1.8–7.7)
NEUTROPHILS NFR BLD: 59.9 % (ref 38–73)
NRBC BLD-RTO: 0 /100 WBC
PLATELET # BLD AUTO: 305 K/UL (ref 150–450)
PMV BLD AUTO: 10.6 FL (ref 9.2–12.9)
RBC # BLD AUTO: 5.1 M/UL (ref 4.6–6.2)
WBC # BLD AUTO: 12.44 K/UL (ref 3.9–12.7)

## 2022-01-06 PROCEDURE — 84484 ASSAY OF TROPONIN QUANT: CPT | Performed by: NURSE PRACTITIONER

## 2022-01-06 PROCEDURE — 93010 EKG 12-LEAD: ICD-10-PCS | Mod: ,,, | Performed by: INTERNAL MEDICINE

## 2022-01-06 PROCEDURE — 85025 COMPLETE CBC W/AUTO DIFF WBC: CPT | Performed by: NURSE PRACTITIONER

## 2022-01-06 PROCEDURE — 93005 ELECTROCARDIOGRAM TRACING: CPT | Performed by: INTERNAL MEDICINE

## 2022-01-06 PROCEDURE — 83880 ASSAY OF NATRIURETIC PEPTIDE: CPT | Performed by: NURSE PRACTITIONER

## 2022-01-06 PROCEDURE — 80053 COMPREHEN METABOLIC PANEL: CPT | Performed by: NURSE PRACTITIONER

## 2022-01-06 PROCEDURE — 93010 ELECTROCARDIOGRAM REPORT: CPT | Mod: ,,, | Performed by: INTERNAL MEDICINE

## 2022-01-06 PROCEDURE — 99284 EMERGENCY DEPT VISIT MOD MDM: CPT | Mod: 25

## 2022-01-06 PROCEDURE — 96360 HYDRATION IV INFUSION INIT: CPT

## 2022-01-07 VITALS
TEMPERATURE: 99 F | RESPIRATION RATE: 17 BRPM | BODY MASS INDEX: 29.96 KG/M2 | DIASTOLIC BLOOD PRESSURE: 68 MMHG | HEART RATE: 82 BPM | OXYGEN SATURATION: 91 % | HEIGHT: 71 IN | SYSTOLIC BLOOD PRESSURE: 138 MMHG | WEIGHT: 214 LBS

## 2022-01-07 LAB
ALBUMIN SERPL BCP-MCNC: 5.2 G/DL (ref 3.5–5.2)
ALP SERPL-CCNC: 62 U/L (ref 55–135)
ALT SERPL W/O P-5'-P-CCNC: 40 U/L (ref 10–44)
ANION GAP SERPL CALC-SCNC: 16 MMOL/L (ref 8–16)
AST SERPL-CCNC: 27 U/L (ref 10–40)
BILIRUB SERPL-MCNC: 1.1 MG/DL (ref 0.1–1)
BNP SERPL-MCNC: 16 PG/ML (ref 0–99)
BUN SERPL-MCNC: 30 MG/DL (ref 6–20)
CALCIUM SERPL-MCNC: 9.4 MG/DL (ref 8.7–10.5)
CHLORIDE SERPL-SCNC: 95 MMOL/L (ref 95–110)
CO2 SERPL-SCNC: 26 MMOL/L (ref 23–29)
CREAT SERPL-MCNC: 1 MG/DL (ref 0.5–1.4)
EST. GFR  (AFRICAN AMERICAN): >60 ML/MIN/1.73 M^2
EST. GFR  (NON AFRICAN AMERICAN): >60 ML/MIN/1.73 M^2
GLUCOSE SERPL-MCNC: 112 MG/DL (ref 70–110)
POTASSIUM SERPL-SCNC: 3 MMOL/L (ref 3.5–5.1)
PROT SERPL-MCNC: 8.3 G/DL (ref 6–8.4)
SARS-COV-2 RDRP RESP QL NAA+PROBE: NEGATIVE
SODIUM SERPL-SCNC: 137 MMOL/L (ref 136–145)
TROPONIN I SERPL DL<=0.01 NG/ML-MCNC: <0.03 NG/ML

## 2022-01-07 PROCEDURE — U0002 COVID-19 LAB TEST NON-CDC: HCPCS | Performed by: EMERGENCY MEDICINE

## 2022-01-07 PROCEDURE — 25000003 PHARM REV CODE 250: Performed by: EMERGENCY MEDICINE

## 2022-01-07 RX ORDER — METOPROLOL SUCCINATE 25 MG/1
50 TABLET, EXTENDED RELEASE ORAL DAILY
Qty: 30 TABLET | Refills: 0 | Status: SHIPPED | OUTPATIENT
Start: 2022-01-07

## 2022-01-07 RX ORDER — POTASSIUM CHLORIDE 20 MEQ/1
40 TABLET, EXTENDED RELEASE ORAL
Status: COMPLETED | OUTPATIENT
Start: 2022-01-07 | End: 2022-01-07

## 2022-01-07 RX ORDER — CLONIDINE HYDROCHLORIDE 0.1 MG/1
0.1 TABLET ORAL EVERY 8 HOURS PRN
Qty: 30 TABLET | Refills: 0 | Status: SHIPPED | OUTPATIENT
Start: 2022-01-07 | End: 2022-02-27 | Stop reason: SDUPTHER

## 2022-01-07 RX ADMIN — SODIUM CHLORIDE 1000 ML: 0.9 INJECTION, SOLUTION INTRAVENOUS at 01:01

## 2022-01-07 RX ADMIN — POTASSIUM CHLORIDE 40 MEQ: 20 TABLET, EXTENDED RELEASE ORAL at 01:01

## 2022-01-07 NOTE — ED PROVIDER NOTES
Encounter Date: 1/6/2022       History     Chief Complaint   Patient presents with    Hypertension     His blood pressure was 205/132 at home      Patient here reported elevated blood pressure was to a viable 132 at home he was feeling  Somewhat lightheaded with the symptoms patient has doubled his medications today patient denies any recent illness no nausea vomiting or diarrhea no fever chills no recent changes in his medications does report that he had been on high and hypertensive in the past at 1 point had been on 150 mg of metoprolol daily is now currently restarted at 25 mg he also takes HCTZ        Review of patient's allergies indicates:  No Known Allergies  Past Medical History:   Diagnosis Date    Hypertension     Restless legs syndrome     Sleep apnea      Past Surgical History:   Procedure Laterality Date    HAND SURGERY       Family History   Problem Relation Age of Onset    No Known Problems Mother     Heart attack Father     No Known Problems Brother      Social History     Tobacco Use    Smoking status: Current Every Day Smoker     Types: Vaping with nicotine    Smokeless tobacco: Never Used   Substance Use Topics    Alcohol use: Yes     Comment: Sociall    Drug use: Never     Review of Systems   Constitutional: Negative for chills and fever.   Eyes: Negative for visual disturbance.   Respiratory: Negative for shortness of breath.    Cardiovascular: Negative for chest pain and leg swelling.   Neurological: Positive for light-headedness. Negative for weakness and numbness.   All other systems reviewed and are negative.      Physical Exam     Initial Vitals [01/06/22 2236]   BP Pulse Resp Temp SpO2   (!) 229/160 (!) 121 18 97.8 °F (36.6 °C) 100 %      MAP       --         Physical Exam    Constitutional: He appears well-developed and well-nourished. No distress.   HENT:   Head: Normocephalic and atraumatic.   Right Ear: External ear normal.   Left Ear: External ear normal.   Mouth/Throat:  Oropharynx is clear and moist.   Eyes: EOM are normal. Pupils are equal, round, and reactive to light.   Neck: Neck supple.   Normal range of motion.  Cardiovascular: Normal rate, regular rhythm, S1 normal, S2 normal, normal heart sounds and intact distal pulses.   Pulmonary/Chest: Breath sounds normal.   Abdominal: Abdomen is soft. Normal appearance and bowel sounds are normal. There is no abdominal tenderness.   Musculoskeletal:         General: Normal range of motion.      Cervical back: Normal range of motion and neck supple.     Neurological: He is alert and oriented to person, place, and time. GCS eye subscore is 4. GCS verbal subscore is 5. GCS motor subscore is 6.   Skin: Skin is warm and dry. No rash noted.   Psychiatric: He has a normal mood and affect. His behavior is normal.         ED Course   Procedures  Labs Reviewed   CBC W/ AUTO DIFFERENTIAL - Abnormal; Notable for the following components:       Result Value    MCH 31.6 (*)     Immature Grans (Abs) 0.05 (*)     Mono # 1.1 (*)     All other components within normal limits   COMPREHENSIVE METABOLIC PANEL - Abnormal; Notable for the following components:    Potassium 3.0 (*)     Glucose 112 (*)     BUN 30 (*)     Total Bilirubin 1.1 (*)     All other components within normal limits   TROPONIN I   B-TYPE NATRIURETIC PEPTIDE   SARS-COV-2 RNA AMPLIFICATION, QUAL   URINALYSIS, REFLEX TO URINE CULTURE        ECG Results          EKG 12-lead (In process)  Result time 01/07/22 05:05:41    In process by Interface, Lab In [facility] (01/07/22 05:05:41)                 Narrative:    Test Reason : I10,    Vent. Rate : 112 BPM     Atrial Rate : 112 BPM     P-R Int : 170 ms          QRS Dur : 082 ms      QT Int : 330 ms       P-R-T Axes : 025 033 029 degrees     QTc Int : 450 ms    Sinus tachycardia  Otherwise normal ECG  When compared with ECG of 09-OCT-2021 02:35,  No significant change was found    Referred By: [email or web address]   SELF           Confirmed By:                              Imaging Results    None          Medications   potassium chloride SA CR tablet 40 mEq (40 mEq Oral Given 1/7/22 0107)   sodium chloride 0.9% bolus 1,000 mL (0 mLs Intravenous Stopped 1/7/22 0230)     Medical Decision Making:   ED Management:   Patient blood pressure improved emergency department without intervention reviewed patient's medications and laboratory evaluation recommend that he increase his metoprolol dose to 50 mg daily return to ER for any worsened symptoms or new symptoms will provide him with a prescription for clonidine to use p.r.n. for severely elevated blood pressures greater than 160 keep a blood pressure log until follow-up with his primary care provider return to ER for any worsened symptoms or new symptoms                      Clinical Impression:   Final diagnoses:  [I10] HTN (hypertension)  [I10] Hypertension          ED Disposition Condition    Discharge Stable        ED Prescriptions     Medication Sig Dispense Start Date End Date Auth. Provider    metoprolol succinate (TOPROL-XL) 25 MG 24 hr tablet Take 2 tablets (50 mg total) by mouth once daily. 30 tablet 1/7/2022  Rubin Obrien MD    cloNIDine (CATAPRES) 0.1 MG tablet Take 1 tablet (0.1 mg total) by mouth every 8 (eight) hours as needed (Systolic blood pressure greater than 160). 30 tablet 1/7/2022 1/7/2023 Rubin Obrien MD        Follow-up Information     Follow up With Specialties Details Why Contact Info    Colton Parker MD Family Medicine Call in 1 day for re-examination of your symptoms [address] [facility] [address]  [phone]             Rubin Obrien MD  01/07/22 0501       Rubin Obrien MD  01/07/22 0508

## 2022-02-02 ENCOUNTER — PATIENT MESSAGE (OUTPATIENT)
Dept: PSYCHIATRY | Facility: CLINIC | Age: 37
End: 2022-02-02
Payer: COMMERCIAL

## 2022-02-09 ENCOUNTER — OFFICE VISIT (OUTPATIENT)
Dept: PSYCHIATRY | Facility: CLINIC | Age: 37
End: 2022-02-09
Payer: COMMERCIAL

## 2022-02-09 VITALS
BODY MASS INDEX: 32.03 KG/M2 | DIASTOLIC BLOOD PRESSURE: 140 MMHG | SYSTOLIC BLOOD PRESSURE: 197 MMHG | HEIGHT: 71 IN | WEIGHT: 228.81 LBS

## 2022-02-09 DIAGNOSIS — G47.09 OTHER INSOMNIA: Primary | ICD-10-CM

## 2022-02-09 DIAGNOSIS — I10 ESSENTIAL HYPERTENSION: ICD-10-CM

## 2022-02-09 PROCEDURE — 1159F MED LIST DOCD IN RCRD: CPT | Mod: CPTII,S$GLB,,

## 2022-02-09 PROCEDURE — 1160F PR REVIEW ALL MEDS BY PRESCRIBER/CLIN PHARMACIST DOCUMENTED: ICD-10-PCS | Mod: CPTII,S$GLB,,

## 2022-02-09 PROCEDURE — 1160F RVW MEDS BY RX/DR IN RCRD: CPT | Mod: CPTII,S$GLB,,

## 2022-02-09 PROCEDURE — 99214 OFFICE O/P EST MOD 30 MIN: CPT | Mod: S$GLB,,,

## 2022-02-09 PROCEDURE — 99999 PR PBB SHADOW E&M-EST. PATIENT-LVL III: ICD-10-PCS | Mod: PBBFAC,,,

## 2022-02-09 PROCEDURE — 3008F PR BODY MASS INDEX (BMI) DOCUMENTED: ICD-10-PCS | Mod: CPTII,S$GLB,,

## 2022-02-09 PROCEDURE — 3077F PR MOST RECENT SYSTOLIC BLOOD PRESSURE >= 140 MM HG: ICD-10-PCS | Mod: CPTII,S$GLB,,

## 2022-02-09 PROCEDURE — 3080F PR MOST RECENT DIASTOLIC BLOOD PRESSURE >= 90 MM HG: ICD-10-PCS | Mod: CPTII,S$GLB,,

## 2022-02-09 PROCEDURE — 1159F PR MEDICATION LIST DOCUMENTED IN MEDICAL RECORD: ICD-10-PCS | Mod: CPTII,S$GLB,,

## 2022-02-09 PROCEDURE — 3008F BODY MASS INDEX DOCD: CPT | Mod: CPTII,S$GLB,,

## 2022-02-09 PROCEDURE — 99214 PR OFFICE/OUTPT VISIT, EST, LEVL IV, 30-39 MIN: ICD-10-PCS | Mod: S$GLB,,,

## 2022-02-09 PROCEDURE — 3080F DIAST BP >= 90 MM HG: CPT | Mod: CPTII,S$GLB,,

## 2022-02-09 PROCEDURE — 3077F SYST BP >= 140 MM HG: CPT | Mod: CPTII,S$GLB,,

## 2022-02-09 PROCEDURE — 99999 PR PBB SHADOW E&M-EST. PATIENT-LVL III: CPT | Mod: PBBFAC,,,

## 2022-02-09 RX ORDER — DOXEPIN 3 MG/1
3 TABLET, FILM COATED ORAL NIGHTLY
Qty: 30 TABLET | Refills: 0 | Status: SHIPPED | OUTPATIENT
Start: 2022-02-09 | End: 2022-03-22 | Stop reason: ALTCHOICE

## 2022-02-09 NOTE — PROGRESS NOTES
"Outpatient Psychiatry Follow-Up Visit (MD/NP)    2/9/2022      Clinical Status of Patient:  Outpatient (Ambulatory)    Chief Complaint:  Charly Pires is a 36 y.o. male who presents today for follow-up of insomnia and panic.  Met with patient.      Interval History and Content of Current Session:  Interim Events/Subjective Report/Content of Current Session:     Pt is a 36 y.o. male with past history of insomnia who presents for follow up treatment. Pt established care with me on 12/6/21, where Pt met criteria for insomnia and panic attacks. Pt is currently taking Ambien 5 mg po q.h.s. PRN. Past trials include trazodone, mirtazapine, amitriptyline, Seroquel.      Pt reports Ambien increased amount of time spent sleeping, however, he reports SE including "Sitting up and sleep talking with myself." Pt reports he is sleeping ~4 hours per night. On days off from work, "I stay in bed and nap to catch up on sleep." Pt notes he is able to fall asleep in ~30 minutes with Ambien, but reports continued night time awakenings.     Pt reports his last sleep study was 2 months ago. He sees Dr Garcia, a sleep specialist in [place]. Pt notes he does not use his CPAP as he reports it causes "choking." He also notes 'choking' when he uses the CPAP while awake to become accustomed to it. Pt reports he needs surgery on his palate to remedy this choking sensation.    In the interim, Pt's BP has been uncontrolled, causing another ER visit with a BP of 229/160. Pt's BP continues to be elevated today at 197/140. Pt reports he has an appointment to see his PCP, Dr. Leroy on Feb 14th. Strongly encouraged Pt to keep this appointment.      Depressive Disorder: DENIES all.   Anxiety Disorder: none.   Manic Disorder: DENIES all.   Psychotic Disorder: DENIES all.   Substance Use:  DENIES all.       Past Psychiatric hx:   First psych contact: today, 12/6/21     Prior hospitalizations: denies  Prior suicide attempts or self-harm: " "denies  Prior diagnosis: denies  Prior meds: Seroquel, works but groggy (took a quarter of friend's 400 mg tabs-keeps asleep for ~6 hours)  Current meds: no psychotropic medications  Prior psychotherapy: counseling as a child when parents     Initial HPI: Jose R. is a 35 y.o. male, with no past psychiatric hx presenting to the clinic for an initial evaluation and treatment. PMHx outlined below. Pt is currently taking no psychotropic medications. Pt notes past trials of ambien which caused strange dreams, seroquel which made him difficult to arouse the next day, and trazodone which was ineffective. Reports current symptoms of insomnia that began around 10/8/21 after Pt was seen in the ER 2 times in 2 days for elevated BP. He reports that on 10/8 he felt unwell at work and when he arrived home he experienced a panic attack with sweating and severe anxiety. He noted he tried to go to sleep but was unable so he checked his BP which was 242/170 at home and 256/182 at the ER.     He reports panic attacks beginning 3 years ago but states they mostly resolved and were never this bad before. He has approximately 1-2 panic attacks per month. He describes these panic attacks as, "I will be having a normal day then all of a sudden I would feel anxious and SOB and have to stand up and walk around." Notes these attacks usually last 15-20 minutes and only happen during the day. He denies depressed mood, anhedonia, unintentional appetite/weight changes, feelings of worthlessness, hopelessness, or guilt, difficulty concentrating, or recurrent thoughts of death or SI. He also denies anxiety at any time other than when he has an occasional panic attack and denies anxiety upon attempting to initiate sleep at night.     Pt states that after the visits to the ER in October, "Now I just dont sleep, everytime I fall asleep my body jerks and I wake up. My arms and legs are flailing, I am irritated, agitated, and restless. It's a very " "worrying uncomfortable feeling." He reports this feeling lasts 1-5 minutes upon awakening. He further states this insomnia does not happen every night, states, "Sometimes I will go a week where everything is fine but then it will happen again." He reports he will fall asleep for ~30 seconds to 1 minute but then will wake up. After the 3rd time this happens, he will stay up all night. He reports he has been drinking alcohol nightly in order to sleep. Pt reports these episodes are not similar to panic attacks and denies feelings of chest pain, palpitations, SOB, hot or cold sensations, numbness or tingling, feelings of "going crazy," etc. He reports a history of sleep apnea also, but states these episodes are "Not like that. I would wake up gasping for air when it was the sleep apnea that was waking me up." Reports he uses CPAP but has been unable to for the last 2 months.    On Psych ROS:  Depression: denies depressed mood, anhedonia, unintentional appetite/weight changes, feelings of worthlessness, hopelessness, or guilt, difficulty concentrating, or recurrent thoughts of death or SI  Jessica: denies episodes of expansive mood, decreased need for sleep, increased goal directed behaviors, or racing thoughts  Anxiety: reports panic attacks; denies agoraphobia, social anxiety, phobias, excessive worry, avoidance, or somatic related complaints   OCD: denies obsessions or compulsive behaviors  PTSD: denies flashbacks, nightmares, or avoidance of stimuli  Psychosis: denies A/V hallucinations or delusions   SI/HI - denies suicidal ideation, plan, or thoughts of harm to self or others  Access to guns: yes, Pt is a competitive shooter    Family Hx:   Paternal: no psychiatric history or history of substance abuse or suicide  Maternal: no psychiatric history or history of substance abuse or suicide  Siblings: brother hx anxiety     Social Hx:   Childhood: born and raised in [place]  Marital Status:   Children: " "2  Resides: [place]  Occupation:  at [employer]  Hobbies: sailing, being on the water, shooting guns, collecting guns  Jainism: Mu-ism  Education level: GED, some culinary school  : denies  Legal: DUI 2015    Past Medical hx:   Past Medical History:   Diagnosis Date    Hypertension     Restless legs syndrome     Sleep apnea        Interim hx:  Medication changes last visit:   · Discontinue mirtazapine 15 mg po q.h.s. PRN insomnia  · Start Ambien 5 mg po q.h.s. PRN insomnia  · Continue to practice good sleep hygiene    Anxiety: denies panic attacks in the interim  Depression: denies     Denies suicidal/homicidal ideations.  Denies hopelessness/worthlessness.    Denies auditory/visual hallucinations      Alcohol/Drugs/Caffeine/Tobacco: No change in consumption. Continues to report "A few cocktails," at night.   Substance Hx:  Caffeine: trying to cut out completely  Tobacco: smoked x 20 years, quit when got COVID, vaping trying to quit  Alcohol: every night to go to sleep, 3-5 cocktails or glasses of wine  Drug use: denies  Rehab: denies  Prior/current AA: courses after DUI    Review of Systems   · PSYCHIATRIC: Pertinant items are noted in the narrative.  · All other systems reviewed and found to be negative       Past Medical, Family and Social History: The patient's past medical, family and social history have been reviewed and updated as appropriate within the electronic medical record - see encounter notes.    Medications: Ambien 5 mg po q.h.s. PRN insomnia     Compliance: yes    Side effects: see above    Risk Parameters:  Patient reports no suicidal ideation  Patient reports no homicidal ideation  Patient reports no self-injurious behavior  Patient reports no violent behavior    Exam   Constitutional  Vitals:  Most recent vital signs, dated less than 90 days prior to this appointment, were reviewed.   Last 3 sets of Vitals    Vitals - 1 value per visit 1/7/2022 2/9/2022 2/9/2022 "   SYSTOLIC 138 - 197   DIASTOLIC 68 - 140   Pulse 82 - -   Temp - - -   Resp 17 - -   SPO2 91 - -   Weight (lb) - - 228.84   Weight (kg) - - 103.8   Height - - 71   BMI (Calculated) - - 31.9   VISIT REPORT - - -   Pain Score  - 0 -          General:  unremarkable, age appropriate     Musculoskeletal  Muscle Strength/Tone:  no rigidity, no flaccidity, no dystonia, no tic   Gait & Station:  non-ataxic     Psychiatric  Speech:  no latency; no press   Mood & Affect:  euthymic  congruent and appropriate   Thought Process:  normal and logical   Associations:  intact   Thought Content:  normal, no suicidality, no homicidality, delusions, or paranoia   Insight:  intact   Judgement: behavior is adequate to circumstances   Orientation:  grossly intact   Memory: intact for content of interview   Language: grossly intact   Attention Span & Concentration:  able to focus   Fund of Knowledge:  intact and appropriate to age and level of education     Assessment and Diagnosis   Status/Progress: Based on the examination today, the patient's problem(s) is/are inadequately controlled.  New problems have not been presented today.   Co-morbidities are complicating management of the primary condition.  There are no active rule-out diagnoses for this patient at this time.     General Impression: Pt is a 36 y.o. male with past history of insomnia and panic attacks presenting today for follow up. He denies panic attacks in the interim, but states he is still unable to sleep through the night and continues to have night time awakenings. Ambien will be discontinued d/t SE. Past trials include trazodone, mirtazapine, amitriptyline, Seroquel.     Discussed risks, benefits, and side effects of doxepin including but not limited to potential for anticholinergic effects (e.g. constipation, xerostomia, blurred vision, urinary retention), CNS depression, fractures, glaucoma, orthostatic hypotension, QT prolongation, SIADH and hyponatremia, and  hazardous sleep-related activities (e.g. sleep-driving). Pt verbalized understanding. Per shared decision making with the Pt, doxepin will be started at 3 mg po q.h.s. for insomnia.    Again discussed CBT for insomnia. Pt agrees to referral.    HTN - Keep appt with PCP on 2/14/22    This patient's profile was checked on the [place] Prescription Monitoring Program. No evidence of misuse.    Safe for outpatient follow up and no acute safety concerns.    Encounter Diagnoses   Name Primary?    Other insomnia Yes    Essential hypertension          Intervention/Counseling/Treatment Plan   · Medication Management: The risks and benefits of medication were discussed with the patient. Shared decision making occurred   · The treatment plan and follow up plan were reviewed with the patient.   · Discontinue Ambien 5 mg po q.h.s. PRN insomnia  · Start doxepin 3 mg po q.h.s. PRN insomnia  · Continue to practice good sleep hygiene  · Offered referral for individual psychotherapy.  · Referral placed for CBT-I to Dr. Samy Georges  · Call to report any worsening of symptoms or problems with the medication. Pt instructed to go to ER with thoughts of harming self, others  · Labs: no new orders      Return to Clinic: 1 month    -Pt given contact number for [facility]otherapists at [facility] ans also instructed they may check with insurance for a list of providers  -Spent 30min face to face with the pt; >50% time spent in counseling   -Supportive therapy and psychoeducation provided  -R/B/SE's of medications discussed with the pt who expresses understanding and chooses to take medications as prescribed.   -Pt instructed to call clinic, 911 or go to nearest emergency room if sxs worsen or pt is in   crisis. The pt expresses understanding.    Gloria Medina

## 2022-02-16 DIAGNOSIS — I10 ESSENTIAL HYPERTENSION, MALIGNANT: Primary | ICD-10-CM

## 2022-02-21 ENCOUNTER — HOSPITAL ENCOUNTER (EMERGENCY)
Facility: HOSPITAL | Age: 37
Discharge: HOME | End: 2022-02-21
Attending: EMERGENCY MEDICINE
Payer: COMMERCIAL

## 2022-02-21 VITALS
RESPIRATION RATE: 18 BRPM | WEIGHT: 220 LBS | TEMPERATURE: 98 F | HEIGHT: 71 IN | HEART RATE: 87 BPM | SYSTOLIC BLOOD PRESSURE: 126 MMHG | BODY MASS INDEX: 30.8 KG/M2 | OXYGEN SATURATION: 95 % | DIASTOLIC BLOOD PRESSURE: 88 MMHG

## 2022-02-21 DIAGNOSIS — I10 HYPERTENSION, UNSPECIFIED TYPE: Primary | ICD-10-CM

## 2022-02-21 LAB
ALBUMIN SERPL BCP-MCNC: 4.2 G/DL (ref 3.5–5.2)
ALP SERPL-CCNC: 59 U/L (ref 55–135)
ALT SERPL W/O P-5'-P-CCNC: 65 U/L (ref 10–44)
ANION GAP SERPL CALC-SCNC: 9 MMOL/L (ref 8–16)
AST SERPL-CCNC: 32 U/L (ref 10–40)
BASOPHILS # BLD AUTO: 0.07 K/UL (ref 0–0.2)
BASOPHILS NFR BLD: 0.8 % (ref 0–1.9)
BILIRUB SERPL-MCNC: 0.9 MG/DL (ref 0.1–1)
BNP SERPL-MCNC: 20 PG/ML (ref 0–99)
BUN SERPL-MCNC: 16 MG/DL (ref 6–20)
CALCIUM SERPL-MCNC: 8.6 MG/DL (ref 8.7–10.5)
CHLORIDE SERPL-SCNC: 102 MMOL/L (ref 95–110)
CO2 SERPL-SCNC: 24 MMOL/L (ref 23–29)
CREAT SERPL-MCNC: 0.9 MG/DL (ref 0.5–1.4)
DIFFERENTIAL METHOD: ABNORMAL
EOSINOPHIL # BLD AUTO: 0.1 K/UL (ref 0–0.5)
EOSINOPHIL NFR BLD: 1.4 % (ref 0–8)
ERYTHROCYTE [DISTWIDTH] IN BLOOD BY AUTOMATED COUNT: 11.8 % (ref 11.5–14.5)
EST. GFR  (AFRICAN AMERICAN): >60 ML/MIN/1.73 M^2
EST. GFR  (NON AFRICAN AMERICAN): >60 ML/MIN/1.73 M^2
GLUCOSE SERPL-MCNC: 146 MG/DL (ref 70–110)
HCT VFR BLD AUTO: 42.6 % (ref 40–54)
HGB BLD-MCNC: 14.9 G/DL (ref 14–18)
IMM GRANULOCYTES # BLD AUTO: 0.05 K/UL (ref 0–0.04)
IMM GRANULOCYTES NFR BLD AUTO: 0.6 % (ref 0–0.5)
INR PPP: 1
LYMPHOCYTES # BLD AUTO: 2.2 K/UL (ref 1–4.8)
LYMPHOCYTES NFR BLD: 23.7 % (ref 18–48)
MCH RBC QN AUTO: 31.8 PG (ref 27–31)
MCHC RBC AUTO-ENTMCNC: 35 G/DL (ref 32–36)
MCV RBC AUTO: 91 FL (ref 82–98)
MONOCYTES # BLD AUTO: 0.6 K/UL (ref 0.3–1)
MONOCYTES NFR BLD: 6.9 % (ref 4–15)
NEUTROPHILS # BLD AUTO: 6.1 K/UL (ref 1.8–7.7)
NEUTROPHILS NFR BLD: 66.6 % (ref 38–73)
NRBC BLD-RTO: 0 /100 WBC
PLATELET # BLD AUTO: 226 K/UL (ref 150–450)
PMV BLD AUTO: 10.5 FL (ref 9.2–12.9)
POTASSIUM SERPL-SCNC: 4 MMOL/L (ref 3.5–5.1)
PROT SERPL-MCNC: 6.8 G/DL (ref 6–8.4)
PROTHROMBIN TIME: 12.7 SEC (ref 11.4–13.7)
RBC # BLD AUTO: 4.69 M/UL (ref 4.6–6.2)
SODIUM SERPL-SCNC: 135 MMOL/L (ref 136–145)
TROPONIN I SERPL DL<=0.01 NG/ML-MCNC: <0.03 NG/ML
WBC # BLD AUTO: 9.08 K/UL (ref 3.9–12.7)

## 2022-02-21 PROCEDURE — 99284 EMERGENCY DEPT VISIT MOD MDM: CPT | Mod: 25

## 2022-02-21 PROCEDURE — 84484 ASSAY OF TROPONIN QUANT: CPT | Performed by: EMERGENCY MEDICINE

## 2022-02-21 PROCEDURE — 83880 ASSAY OF NATRIURETIC PEPTIDE: CPT | Performed by: EMERGENCY MEDICINE

## 2022-02-21 PROCEDURE — 93010 ELECTROCARDIOGRAM REPORT: CPT | Mod: ,,, | Performed by: GENERAL PRACTICE

## 2022-02-21 PROCEDURE — 96360 HYDRATION IV INFUSION INIT: CPT

## 2022-02-21 PROCEDURE — 96361 HYDRATE IV INFUSION ADD-ON: CPT

## 2022-02-21 PROCEDURE — 93005 ELECTROCARDIOGRAM TRACING: CPT | Performed by: GENERAL PRACTICE

## 2022-02-21 PROCEDURE — 25000003 PHARM REV CODE 250: Performed by: EMERGENCY MEDICINE

## 2022-02-21 PROCEDURE — 93010 EKG 12-LEAD: ICD-10-PCS | Mod: ,,, | Performed by: GENERAL PRACTICE

## 2022-02-21 PROCEDURE — 85025 COMPLETE CBC W/AUTO DIFF WBC: CPT | Performed by: EMERGENCY MEDICINE

## 2022-02-21 PROCEDURE — 80053 COMPREHEN METABOLIC PANEL: CPT | Performed by: EMERGENCY MEDICINE

## 2022-02-21 PROCEDURE — 85610 PROTHROMBIN TIME: CPT | Performed by: EMERGENCY MEDICINE

## 2022-02-21 RX ORDER — AMLODIPINE BESYLATE 5 MG/1
5 TABLET ORAL DAILY
COMMUNITY

## 2022-02-21 RX ORDER — SODIUM CHLORIDE 9 MG/ML
500 INJECTION, SOLUTION INTRAVENOUS
Status: COMPLETED | OUTPATIENT
Start: 2022-02-21 | End: 2022-02-21

## 2022-02-21 RX ORDER — NAPROXEN SODIUM 220 MG/1
324 TABLET, FILM COATED ORAL ONCE
Status: DISCONTINUED | OUTPATIENT
Start: 2022-02-21 | End: 2022-02-21 | Stop reason: HOSPADM

## 2022-02-21 RX ADMIN — SODIUM CHLORIDE 500 ML: 0.9 INJECTION, SOLUTION INTRAVENOUS at 09:02

## 2022-02-21 NOTE — ED PROVIDER NOTES
Encounter Date: 2/21/2022       History     Chief Complaint   Patient presents with    Hypertension     Took a clonidine 0.2 one hour PTA     Patient presents complaining of high blood pressure.  Patient states this morning he felt dizzy and nauseated with some associated chest tightness.  He took an extra clonidine and came to the emergency department.  Upon arrival to the ER patient is feeling improved and blood pressure is improving.  He denies any severe headache.  He denies any chest pain.  No shortness of breath.        Review of patient's allergies indicates:  No Known Allergies  Past Medical History:   Diagnosis Date    Hypertension     Restless legs syndrome     Sleep apnea      Past Surgical History:   Procedure Laterality Date    HAND SURGERY       Family History   Problem Relation Age of Onset    No Known Problems Mother     Heart attack Father     No Known Problems Brother      Social History     Tobacco Use    Smoking status: Current Every Day Smoker     Types: Vaping with nicotine    Smokeless tobacco: Never Used   Substance Use Topics    Alcohol use: Yes     Comment: socially    Drug use: Never     Review of Systems   All other systems reviewed and are negative.      Physical Exam     Initial Vitals [02/21/22 0852]   BP Pulse Resp Temp SpO2   (!) 179/120 (!) 111 20 98.8 °F (37.1 °C) 96 %      MAP       --         Physical Exam    Nursing note and vitals reviewed.  Constitutional: He appears well-developed and well-nourished. He is not diaphoretic. No distress.   HENT:   Head: Normocephalic and atraumatic.   Mouth/Throat: Oropharynx is clear and moist.   Eyes: EOM are normal.   Neck: Neck supple.   Normal range of motion.  Cardiovascular: Normal rate, regular rhythm, normal heart sounds and intact distal pulses.   Pulmonary/Chest: Breath sounds normal. No respiratory distress.   Abdominal: Abdomen is soft.   Musculoskeletal:         General: Normal range of motion.      Cervical back:  Normal range of motion and neck supple.     Neurological: He is alert and oriented to person, place, and time. He has normal strength.   Vision-normal  Neglect-normal  Aphasia - normal  Pronator drift - normal  Cerebellum - normal   Skin: Skin is warm and dry.   Psychiatric: He has a normal mood and affect. His behavior is normal. Judgment and thought content normal.         ED Course   Procedures  Labs Reviewed   CBC W/ AUTO DIFFERENTIAL - Abnormal; Notable for the following components:       Result Value    MCH 31.8 (*)     Immature Granulocytes 0.6 (*)     Immature Grans (Abs) 0.05 (*)     All other components within normal limits   COMPREHENSIVE METABOLIC PANEL - Abnormal; Notable for the following components:    Sodium 135 (*)     Glucose 146 (*)     Calcium 8.6 (*)     ALT 65 (*)     All other components within normal limits   B-TYPE NATRIURETIC PEPTIDE   TROPONIN I   PROTIME-INR        ECG Results          EKG 12-lead (In process)  Result time 02/21/22 09:13:33    In process by Interface, Lab In [facility] (02/21/22 09:13:33)                 Narrative:    Test Reason : R07.9,    Vent. Rate : 103 BPM     Atrial Rate : 103 BPM     P-R Int : 152 ms          QRS Dur : 074 ms      QT Int : 322 ms       P-R-T Axes : 033 047 010 degrees     QTc Int : 421 ms    Sinus tachycardia  Otherwise normal ECG  When compared with ECG of 06-JAN-2022 22:43,  No significant change was found    Referred By: [email or web address]   SELF           Confirmed By:                             Imaging Results          X-Ray Chest AP Portable (Final result)  Result time 02/21/22 09:21:44    Final result by Charly Tobias MD (02/21/22 09:21:44)                 Narrative:    Reason: hypertension    FINDINGS:    PA and lateral chest with comparison chest x-ray October 8, 2021 show normal cardiomediastinal silhouette.  Lungs are clear. Pulmonary vasculature is normal. No acute osseous abnormality.    IMPRESSION:    No acute cardiopulmonary  abnormality.    Electronically signed by:  Charly Tobias DO  2/21/2022 9:21 AM CST Workstation: [id number]                               Medications   aspirin chewable tablet 324 mg (has no administration in time range)   0.9%  NaCl infusion (500 mLs Intravenous New Bag 2/21/22 0694)     Medical Decision Making:   Initial Assessment:   No apparent distress.  Differential Diagnosis:   Considerations include hypertensive emergency, end-organ damage  Independently Interpreted Test(s):   I have ordered and independently interpreted EKG Reading(s) - see summary below       <> Summary of EKG Reading(s): EKG is regular rate and rhythm without ST elevation  Clinical Tests:   Lab Tests: Ordered and Reviewed  Radiological Study: Reviewed and Ordered  Medical Tests: Ordered and Reviewed  ED Management:  In the emergency department there is no evidence of end-organ damage.  Patient has been observed for 3 hours and blood pressure has steadily improved and he is asymptomatic.  Patient was advised to continue to follow-up with his primary care doctor and to be compliant with his blood pressure medication.  Patient be discharged stable condition.  Detailed return precautions discussed.                      Clinical Impression:   Final diagnoses:  [I10] Hypertension, unspecified type (Primary)          ED Disposition Condition    Discharge Stable        ED Prescriptions     None        Follow-up Information     Follow up With Specialties Details Why Contact Info    Colton Parker MD Family Medicine In 1 week  [address] [facility] [address]  [phone]             Greg Medina MD  02/21/22 7698

## 2022-02-21 NOTE — ED TRIAGE NOTES
States knows when his BP is high.  Gets dizziness, doesn't feel right.  Took Klonopin at home without relief.  Complaints of abd pains and diarrhea that started this am.  Some nausea but denies vomiting.  Denies fever.

## 2022-02-26 ENCOUNTER — HOSPITAL ENCOUNTER (EMERGENCY)
Facility: HOSPITAL | Age: 37
Discharge: HOME | End: 2022-02-27
Attending: EMERGENCY MEDICINE
Payer: COMMERCIAL

## 2022-02-26 DIAGNOSIS — R07.9 CHEST PAIN: ICD-10-CM

## 2022-02-26 DIAGNOSIS — I10 UNCONTROLLED HYPERTENSION: ICD-10-CM

## 2022-02-26 DIAGNOSIS — I10 HYPERTENSION: Primary | ICD-10-CM

## 2022-02-26 LAB
ALBUMIN SERPL BCP-MCNC: 5 G/DL (ref 3.5–5.2)
ALP SERPL-CCNC: 66 U/L (ref 55–135)
ALT SERPL W/O P-5'-P-CCNC: 63 U/L (ref 10–44)
ANION GAP SERPL CALC-SCNC: 15 MMOL/L (ref 8–16)
AST SERPL-CCNC: 32 U/L (ref 10–40)
BASOPHILS # BLD AUTO: 0.1 K/UL (ref 0–0.2)
BASOPHILS NFR BLD: 0.8 % (ref 0–1.9)
BILIRUB SERPL-MCNC: 1.1 MG/DL (ref 0.1–1)
BNP SERPL-MCNC: 19 PG/ML (ref 0–99)
BUN SERPL-MCNC: 16 MG/DL (ref 6–20)
CALCIUM SERPL-MCNC: 9.8 MG/DL (ref 8.7–10.5)
CHLORIDE SERPL-SCNC: 98 MMOL/L (ref 95–110)
CO2 SERPL-SCNC: 24 MMOL/L (ref 23–29)
CREAT SERPL-MCNC: 0.9 MG/DL (ref 0.5–1.4)
DIFFERENTIAL METHOD: ABNORMAL
EOSINOPHIL # BLD AUTO: 0.2 K/UL (ref 0–0.5)
EOSINOPHIL NFR BLD: 1.4 % (ref 0–8)
ERYTHROCYTE [DISTWIDTH] IN BLOOD BY AUTOMATED COUNT: 11.8 % (ref 11.5–14.5)
EST. GFR  (AFRICAN AMERICAN): >60 ML/MIN/1.73 M^2
EST. GFR  (NON AFRICAN AMERICAN): >60 ML/MIN/1.73 M^2
GLUCOSE SERPL-MCNC: 184 MG/DL (ref 70–110)
HCT VFR BLD AUTO: 45 % (ref 40–54)
HGB BLD-MCNC: 15.7 G/DL (ref 14–18)
IMM GRANULOCYTES # BLD AUTO: 0.07 K/UL (ref 0–0.04)
IMM GRANULOCYTES NFR BLD AUTO: 0.5 % (ref 0–0.5)
LYMPHOCYTES # BLD AUTO: 5 K/UL (ref 1–4.8)
LYMPHOCYTES NFR BLD: 37.6 % (ref 18–48)
MCH RBC QN AUTO: 31.3 PG (ref 27–31)
MCHC RBC AUTO-ENTMCNC: 34.9 G/DL (ref 32–36)
MCV RBC AUTO: 90 FL (ref 82–98)
MONOCYTES # BLD AUTO: 1 K/UL (ref 0.3–1)
MONOCYTES NFR BLD: 7.4 % (ref 4–15)
NEUTROPHILS # BLD AUTO: 6.9 K/UL (ref 1.8–7.7)
NEUTROPHILS NFR BLD: 52.3 % (ref 38–73)
NRBC BLD-RTO: 0 /100 WBC
PLATELET # BLD AUTO: 302 K/UL (ref 150–450)
PMV BLD AUTO: 10.9 FL (ref 9.2–12.9)
POTASSIUM SERPL-SCNC: 3.5 MMOL/L (ref 3.5–5.1)
PROT SERPL-MCNC: 7.7 G/DL (ref 6–8.4)
RBC # BLD AUTO: 5.01 M/UL (ref 4.6–6.2)
SODIUM SERPL-SCNC: 137 MMOL/L (ref 136–145)
TROPONIN I SERPL DL<=0.01 NG/ML-MCNC: <0.03 NG/ML
WBC # BLD AUTO: 13.19 K/UL (ref 3.9–12.7)

## 2022-02-26 PROCEDURE — 83880 ASSAY OF NATRIURETIC PEPTIDE: CPT | Performed by: EMERGENCY MEDICINE

## 2022-02-26 PROCEDURE — 93010 EKG 12-LEAD: ICD-10-PCS | Mod: ,,, | Performed by: INTERNAL MEDICINE

## 2022-02-26 PROCEDURE — 80053 COMPREHEN METABOLIC PANEL: CPT | Performed by: EMERGENCY MEDICINE

## 2022-02-26 PROCEDURE — 85025 COMPLETE CBC W/AUTO DIFF WBC: CPT | Performed by: EMERGENCY MEDICINE

## 2022-02-26 PROCEDURE — 25000003 PHARM REV CODE 250: Performed by: EMERGENCY MEDICINE

## 2022-02-26 PROCEDURE — 93005 ELECTROCARDIOGRAM TRACING: CPT | Performed by: INTERNAL MEDICINE

## 2022-02-26 PROCEDURE — 96374 THER/PROPH/DIAG INJ IV PUSH: CPT

## 2022-02-26 PROCEDURE — 99284 EMERGENCY DEPT VISIT MOD MDM: CPT | Mod: 25

## 2022-02-26 PROCEDURE — 84484 ASSAY OF TROPONIN QUANT: CPT | Performed by: EMERGENCY MEDICINE

## 2022-02-26 PROCEDURE — 93010 ELECTROCARDIOGRAM REPORT: CPT | Mod: ,,, | Performed by: INTERNAL MEDICINE

## 2022-02-26 RX ORDER — AMLODIPINE AND BENAZEPRIL HYDROCHLORIDE 5; 10 MG/1; MG/1
1 CAPSULE ORAL DAILY
COMMUNITY
Start: 2022-02-16 | End: 2022-02-27

## 2022-02-26 RX ORDER — NAPROXEN SODIUM 220 MG
220 TABLET ORAL
COMMUNITY

## 2022-02-26 RX ORDER — AMLODIPINE BESYLATE 5 MG/1
5 TABLET ORAL
Status: COMPLETED | OUTPATIENT
Start: 2022-02-26 | End: 2022-02-26

## 2022-02-26 RX ORDER — LABETALOL HYDROCHLORIDE 5 MG/ML
10 INJECTION, SOLUTION INTRAVENOUS
Status: COMPLETED | OUTPATIENT
Start: 2022-02-26 | End: 2022-02-26

## 2022-02-26 RX ADMIN — AMLODIPINE BESYLATE 5 MG: 5 TABLET ORAL at 10:02

## 2022-02-26 RX ADMIN — LABETALOL HYDROCHLORIDE 10 MG: 5 INJECTION INTRAVENOUS at 09:02

## 2022-02-27 VITALS
WEIGHT: 220 LBS | TEMPERATURE: 99 F | OXYGEN SATURATION: 96 % | DIASTOLIC BLOOD PRESSURE: 98 MMHG | HEIGHT: 71 IN | RESPIRATION RATE: 16 BRPM | BODY MASS INDEX: 30.8 KG/M2 | SYSTOLIC BLOOD PRESSURE: 144 MMHG | HEART RATE: 93 BPM

## 2022-02-27 LAB — TROPONIN I SERPL DL<=0.01 NG/ML-MCNC: <0.03 NG/ML

## 2022-02-27 PROCEDURE — 84484 ASSAY OF TROPONIN QUANT: CPT | Performed by: EMERGENCY MEDICINE

## 2022-02-27 RX ORDER — CLONIDINE HYDROCHLORIDE 0.1 MG/1
0.1 TABLET ORAL EVERY 8 HOURS PRN
Qty: 30 TABLET | Refills: 0 | Status: SHIPPED | OUTPATIENT
Start: 2022-02-27 | End: 2022-08-25

## 2022-02-27 RX ORDER — AMLODIPINE AND BENAZEPRIL HYDROCHLORIDE 5; 20 MG/1; MG/1
1 CAPSULE ORAL DAILY
Qty: 90 CAPSULE | Refills: 0 | Status: SHIPPED | OUTPATIENT
Start: 2022-02-27 | End: 2022-05-27

## 2022-02-27 NOTE — DISCHARGE INSTRUCTIONS
Keep a blood pressure log for monitoring of your blood pressure.  Take clonidine only as needed for pressure greater than 185/100    RETURN TO EMERGENCY DEPARTMENT WITHOUT FAIL, IF YOUR SYMPTOMS WORSEN, IF YOU GET NEW OR DIFFERENT SYMPTOMS, IF YOU ARE UNABLE TO FOLLOW UP AS DIRECTED, OR IF YOU HAVE ANY CONCERNS OR WORRIES.

## 2022-02-27 NOTE — ED PROVIDER NOTES
Encounter Date: 2/26/2022       History     Chief Complaint   Patient presents with    Hypertension     Systolic 210 at home. Compliant with meds. Took 0.1mg clonidine at 2025 today    Dizziness    Nausea    Chest Pain     Left sided chest pain 6/10     This is a 36-year-old male with a past medical history of hypertension, sleep apnea, who presents emergency department with hypertension and chest pain.  He says that he has had multiple ER visits recently for his blood pressure.  Says he recently just started to trying get it under control.  He is on metoprolol 100 milligrams and 5 milligrams of amlodipine.  Says he has been taking his medicines.  He says today he could feels blood pressure going up.  Says he felt flushed in his face in his ears and he checked his blood pressure and it was 210 systolic at home.  Says he took his clonidine 0.1 which she has as a rescue medicine and came here.  He says his blood pressure has been high at home in the 160s over 105 systolic range and he has been taking clonidine like every other day.  Said he developed some left-sided chest pain 6/10 some arm pain associated with the blood pressure.  It is resolved currently.  Is said he felt lightheaded as well.  He has some nausea as well.  He is feeling better at the moment.        Review of patient's allergies indicates:  No Known Allergies  Past Medical History:   Diagnosis Date    Hypertension     Restless legs syndrome     Sleep apnea      Past Surgical History:   Procedure Laterality Date    HAND SURGERY       Family History   Problem Relation Age of Onset    No Known Problems Mother     Heart attack Father     No Known Problems Brother      Social History     Tobacco Use    Smoking status: Current Every Day Smoker     Types: Vaping with nicotine    Smokeless tobacco: Never Used   Substance Use Topics    Alcohol use: Yes     Comment: socially    Drug use: Never     Review of Systems   Constitutional: Negative for  chills and fever.   HENT: Negative for sore throat.    Respiratory: Negative for shortness of breath.    Cardiovascular: Positive for chest pain. Negative for palpitations.   Gastrointestinal: Positive for nausea.   Genitourinary: Negative for dysuria.   Musculoskeletal: Negative for back pain.   Skin: Negative for rash.   Neurological: Positive for light-headedness. Negative for weakness and headaches.   Hematological: Does not bruise/bleed easily.   All other systems reviewed and are negative.      Physical Exam     Initial Vitals [02/26/22 2037]   BP Pulse Resp Temp SpO2   (!) 207/122 110 20 98.4 °F (36.9 °C) 96 %      MAP       --         Physical Exam    Nursing note and vitals reviewed.  Constitutional: He appears well-developed and well-nourished. He is not diaphoretic. No distress.   Elevated BMI   HENT:   Head: Normocephalic and atraumatic.   Mouth/Throat: Oropharynx is clear and moist. No oropharyngeal exudate.   Eyes: Conjunctivae and EOM are normal. Pupils are equal, round, and reactive to light.   Neck: Neck supple. No tracheal deviation present.   Cardiovascular: Regular rhythm, normal heart sounds and intact distal pulses.   No murmur heard.  Tachycardic   Pulmonary/Chest: Breath sounds normal. No stridor. No respiratory distress. He has no wheezes. He has no rhonchi. He has no rales.   Abdominal: Abdomen is soft. Bowel sounds are normal. He exhibits no distension. There is no abdominal tenderness. There is no rebound and no guarding.   Musculoskeletal:         General: No tenderness or edema. Normal range of motion.      Cervical back: Neck supple.     Neurological: He is alert and oriented to person, place, and time. He has normal strength. No cranial nerve deficit or sensory deficit.   Skin: Skin is warm and dry. Capillary refill takes less than 2 seconds. No rash noted. No erythema. No pallor.   Psychiatric: He has a normal mood and affect.         ED Course   Procedures  Labs Reviewed   CBC W/  AUTO DIFFERENTIAL - Abnormal; Notable for the following components:       Result Value    WBC 13.19 (*)     MCH 31.3 (*)     Immature Grans (Abs) 0.07 (*)     Lymph # 5.0 (*)     All other components within normal limits   COMPREHENSIVE METABOLIC PANEL - Abnormal; Notable for the following components:    Glucose 184 (*)     Total Bilirubin 1.1 (*)     ALT 63 (*)     All other components within normal limits   TROPONIN I   B-TYPE NATRIURETIC PEPTIDE   TROPONIN I           Results for orders placed or performed during the hospital encounter of 02/26/22   CBC auto differential   Result Value Ref Range    WBC 13.19 (H) 3.90 - 12.70 K/uL    RBC 5.01 4.60 - 6.20 M/uL    Hemoglobin 15.7 14.0 - 18.0 g/dL    Hematocrit 45.0 40.0 - 54.0 %    MCV 90 82 - 98 fL    MCH 31.3 (H) 27.0 - 31.0 pg    MCHC 34.9 32.0 - 36.0 g/dL    RDW 11.8 11.5 - 14.5 %    Platelets 302 150 - 450 K/uL    MPV 10.9 9.2 - 12.9 fL    Immature Granulocytes 0.5 0.0 - 0.5 %    Gran # (ANC) 6.9 1.8 - 7.7 K/uL    Immature Grans (Abs) 0.07 (H) 0.00 - 0.04 K/uL    Lymph # 5.0 (H) 1.0 - 4.8 K/uL    Mono # 1.0 0.3 - 1.0 K/uL    Eos # 0.2 0.0 - 0.5 K/uL    Baso # 0.10 0.00 - 0.20 K/uL    nRBC 0 0 /100 WBC    Gran % 52.3 38.0 - 73.0 %    Lymph % 37.6 18.0 - 48.0 %    Mono % 7.4 4.0 - 15.0 %    Eosinophil % 1.4 0.0 - 8.0 %    Basophil % 0.8 0.0 - 1.9 %    Differential Method Automated    Comprehensive metabolic panel   Result Value Ref Range    Sodium 137 136 - 145 mmol/L    Potassium 3.5 3.5 - 5.1 mmol/L    Chloride 98 95 - 110 mmol/L    CO2 24 23 - 29 mmol/L    Glucose 184 (H) 70 - 110 mg/dL    BUN 16 6 - 20 mg/dL    Creatinine 0.9 0.5 - 1.4 mg/dL    Calcium 9.8 8.7 - 10.5 mg/dL    Total Protein 7.7 6.0 - 8.4 g/dL    Albumin 5.0 3.5 - 5.2 g/dL    Total Bilirubin 1.1 (H) 0.1 - 1.0 mg/dL    Alkaline Phosphatase 66 55 - 135 U/L    AST 32 10 - 40 U/L    ALT 63 (H) 10 - 44 U/L    Anion Gap 15 8 - 16 mmol/L    eGFR if African American >60.0 >60 mL/min/1.73 m^2    eGFR if  non African American >60.0 >60 mL/min/1.73 m^2   Troponin I   Result Value Ref Range    Troponin I <0.030 <=0.040 ng/mL   Brain natriuretic peptide   Result Value Ref Range    BNP 19 0 - 99 pg/mL   Troponin I   Result Value Ref Range    Troponin I <0.030 <=0.040 ng/mL     X-Ray Chest AP Portable   Final Result          Imaging Results          X-Ray Chest AP Portable (Final result)  Result time 02/26/22 23:16:48   Procedure changed from X-Ray Chest 1 View     Final result by Luis Mckeon Jr., MD (02/26/22 23:16:48)                 Narrative:    CHEST X-RAY SINGLE VIEW    HISTORY: Dizziness and nausea    PREVIOUS STUDIES: 2/21/2022    FINDINGS:  The heart size and pulmonary vascularity are within the range of normal.  There is no significant hilar nor mediastinal process.  The aerated lungs are well expanded and clear.  The right and left CP angles are rather sharp.  The osseous structures show nothing unusual.  There is  no detrimental change relative to previous.    IMPRESSION:   Negative chest. NO DETRIMENTAL CHANGE IN THE INTERVAL.    Electronically signed by:  Luis Mckeon MD  2/26/2022 11:16 PM [facility] Workstation: [id number]                               Medications   labetaloL injection 10 mg (10 mg Intravenous Given 2/26/22 2136)   amLODIPine tablet 5 mg (5 mg Oral Given 2/26/22 2239)     Medical Decision Making:   ED Management:  This is a 36-year-old male presents emergency department with pressure lightheadedness, chest pain.  He is feeling much better now that his blood pressure is controlled.  I did give him an extra 5 mg of amlodipine in the emergency department.  He has improved his blood pressure is relatively within normal limits at this point.  I made recommendations regarding increasing his benazepril.  He will increase it from 10-20.  His kidney function is within normal limits.  He will be discharged home with follow-up.  I do not believe that there is any hypertensive emergency on today's  emergency department visit.  He did have some chest pain which did 2 troponins which are negative.  His EKG is nonischemic.  Doubt acute coronary syndrome, low risk heart score.  Doubt PE, low risk Wells score.  Doubt dissection or pneumothorax or pneumonia.  Doubt any esophageal rupture either.  Recommend close PCP follow-up and blood pressure monitoring on an outpatient basis.  He is in agreement the above-mentioned plan.    I had a detailed discussion with the patient and/or guardian regarding: The historical points, exam findings, and diagnostic results supporting the discharge diagnosis, lab results, pertinent radiology results, and the need for outpatient follow-up, for definitive care with a family practitioner and to return to the emergency department if symptoms worsen or persist or if there are any questions or concerns that arise at home. All questions have been answered in detail. Strict return to Emergency Department precautions have been provided    A dictation software program was used for this note.  Please expect some simple typographical  errors in this note.    This patient was seen during the context of the Covid 19 global pandemic where local, state, hospital guidelines, were followed to the best of ability given the circumstances of the pandemic.               ED Course as of 02/27/22 0102   Sat Feb 26, 2022 2127 20:46 sinus tachycardia rate of 125. No ST elevation or depression.  No STEMI.  EKG interpreted independently by me. [JR]      ED Course User Index  [JR] Alex Peterson DO             Clinical Impression:   Final diagnoses:  [R07.9] Chest pain  [I10] Hypertension (Primary)  [I10] Uncontrolled hypertension          ED Disposition Condition    Discharge Stable        ED Prescriptions     Medication Sig Dispense Start Date End Date Auth. Provider    cloNIDine (CATAPRES) 0.1 MG tablet Take 1 tablet (0.1 mg total) by mouth every 8 (eight) hours as needed (Systolic blood pressure greater  than 180). 30 tablet 2/27/2022 2/27/2023 Alex Peterson DO    amlodipine-benazepril 5-20 mg (LOTREL) 5-20 mg per capsule Take 1 capsule by mouth once daily. 90 capsule 2/27/2022 2/27/2023 Alex Peterson DO        Follow-up Information     Follow up With Specialties Details Why Contact Info Additional Information    Colton Parker MD Family Medicine In 2 days  [address] [facility] [address]  [phone]       [facility] - Emergency Dept Emergency Medicine  If symptoms worsen [address] [address]  [phone] 1st floor           Alex Peterson DO  02/27/22 0102

## 2022-03-03 ENCOUNTER — PATIENT OUTREACH (OUTPATIENT)
Dept: EMERGENCY MEDICINE | Facility: HOSPITAL | Age: 37
End: 2022-03-03
Payer: COMMERCIAL

## 2022-03-07 ENCOUNTER — HOSPITAL ENCOUNTER (OUTPATIENT)
Dept: RADIOLOGY | Facility: HOSPITAL | Age: 37
Discharge: HOME | End: 2022-03-07
Attending: FAMILY MEDICINE
Payer: COMMERCIAL

## 2022-03-07 DIAGNOSIS — I10 ESSENTIAL HYPERTENSION, MALIGNANT: ICD-10-CM

## 2022-03-07 PROCEDURE — 76770 US EXAM ABDO BACK WALL COMP: CPT | Mod: TC,PO

## 2022-03-22 RX ORDER — DOXEPIN HYDROCHLORIDE 10 MG/1
10 CAPSULE ORAL NIGHTLY PRN
Qty: 30 CAPSULE | Refills: 0 | Status: SHIPPED | OUTPATIENT
Start: 2022-03-22 | End: 2022-04-05

## 2022-04-06 ENCOUNTER — PATIENT MESSAGE (OUTPATIENT)
Dept: PSYCHIATRY | Facility: CLINIC | Age: 37
End: 2022-04-06
Payer: COMMERCIAL

## 2022-04-06 ENCOUNTER — TELEPHONE (OUTPATIENT)
Dept: PSYCHIATRY | Facility: CLINIC | Age: 37
End: 2022-04-06
Payer: COMMERCIAL

## 2022-04-25 ENCOUNTER — TELEPHONE (OUTPATIENT)
Dept: PSYCHIATRY | Facility: CLINIC | Age: 37
End: 2022-04-25
Payer: COMMERCIAL

## 2022-10-25 ENCOUNTER — PATIENT MESSAGE (OUTPATIENT)
Dept: PSYCHIATRY | Facility: CLINIC | Age: 37
End: 2022-10-25
Payer: COMMERCIAL

## 2022-11-21 ENCOUNTER — PATIENT MESSAGE (OUTPATIENT)
Dept: PSYCHIATRY | Facility: CLINIC | Age: 37
End: 2022-11-21
Payer: COMMERCIAL

## 2023-01-12 DIAGNOSIS — K42.9 UMBILICAL HERNIA WITHOUT OBSTRUCTION OR GANGRENE: Primary | ICD-10-CM

## 2023-01-25 ENCOUNTER — HOSPITAL ENCOUNTER (OUTPATIENT)
Dept: RADIOLOGY | Facility: HOSPITAL | Age: 38
Discharge: HOME | End: 2023-01-25
Attending: SURGERY
Payer: COMMERCIAL

## 2023-01-25 DIAGNOSIS — K42.9 UMBILICAL HERNIA WITHOUT OBSTRUCTION OR GANGRENE: ICD-10-CM

## 2023-01-25 PROCEDURE — 74176 CT ABD & PELVIS W/O CONTRAST: CPT | Mod: TC,PO

## 2024-03-13 ENCOUNTER — HOSPITAL ENCOUNTER (OUTPATIENT)
Dept: RADIOLOGY | Facility: HOSPITAL | Age: 39
Discharge: HOME | End: 2024-03-13
Attending: NURSE PRACTITIONER
Payer: COMMERCIAL

## 2024-03-13 DIAGNOSIS — Z87.891 PERSONAL HISTORY OF TOBACCO USE, PRESENTING HAZARDS TO HEALTH: ICD-10-CM

## 2024-03-13 DIAGNOSIS — I10 ESSENTIAL HYPERTENSION, MALIGNANT: Primary | ICD-10-CM

## 2024-03-13 DIAGNOSIS — R05.3 CHRONIC COUGH: ICD-10-CM

## 2024-03-13 DIAGNOSIS — Z72.0 TOBACCO ABUSE: ICD-10-CM

## 2024-03-13 DIAGNOSIS — R09.81 NASAL CONGESTION: ICD-10-CM

## 2024-03-13 DIAGNOSIS — I10 ESSENTIAL HYPERTENSION, MALIGNANT: ICD-10-CM

## 2024-03-13 PROCEDURE — 71046 X-RAY EXAM CHEST 2 VIEWS: CPT | Mod: TC,PO

## 2024-03-19 ENCOUNTER — TELEPHONE (OUTPATIENT)
Dept: OTOLARYNGOLOGY | Facility: CLINIC | Age: 39
End: 2024-03-19
Payer: COMMERCIAL

## 2024-03-19 NOTE — TELEPHONE ENCOUNTER
1st attempt to contact pt to schedule ENT appt per external referral received from SANDHYA PATEL at [facility].  No answer; LVM for call back to schedule.  Pt portal msg sent to pt due to failed call attempt.     ENT REFERRAL scanned into media.

## 2024-03-20 NOTE — TELEPHONE ENCOUNTER
2ND attempt to contact pt to schedule ENT appt per external referral received from SANDHYA PATEL at [facility].  No answer; LVM for call back to schedule.  Pt portal msg sent to pt due to failed call attempt.      ENT REFERRAL scanned into media.

## 2024-03-26 ENCOUNTER — TELEPHONE (OUTPATIENT)
Dept: OTOLARYNGOLOGY | Facility: CLINIC | Age: 39
End: 2024-03-26
Payer: COMMERCIAL

## 2024-03-26 NOTE — TELEPHONE ENCOUNTER
----- Message from John Julio C sent at 3/26/2024  4:29 PM CDT -----  Regarding: Return Call  Patient Returning Call      Who Called: Pt     Who Left Message for Patient: Elham THOMAS    Does the patient know what this is regarding?: Yes     Would the patient rather a call back or a response via [facility]?  Call back    Best Call Back Number: [phone]      Additional Information:  Please Advise - Thank you

## 2024-03-27 ENCOUNTER — TELEPHONE (OUTPATIENT)
Dept: OTOLARYNGOLOGY | Facility: CLINIC | Age: 39
End: 2024-03-27
Payer: COMMERCIAL

## 2024-03-27 NOTE — TELEPHONE ENCOUNTER
----- Message from Wendy Zaldivar sent at 3/27/2024  3:11 PM CDT -----  Type:  Patient Returning Call    Who Called:  Pt    Who Left Message for Patient:  Elham    Does the patient know what this is regarding?:  yes    Would the patient rather a call back or a response via [facility]?  Call back    Best Call Back Number:  [phone]    Additional Information:  Pt is reaching back out to office.  Please call back to advise. Thanks!

## 2024-03-27 NOTE — TELEPHONE ENCOUNTER
Spoke w/pt per call back msg.  W/pt, discussed that ENT office has received referral from PCP for allergies/nasal congestion and offered to schedule ENT appt.  Pt stated that he was actually wanting to see ENT for KRISHNA (states that he has previously used CPAP but did not work) and for possible removal of tonsils. Acknowledged pt's request and proceeded with scheduling; pt scheduled to see Dr. Olea on 04/25 @ 1500.  Pt confirmed appt date/time.  No further ENT questions/concerns

## 2024-03-27 NOTE — TELEPHONE ENCOUNTER
1st attempt to contact pt per call back msg received for scheduling.  No answer; LVM to call back office.  Pt portal msg sent to pt due to failed call attempt.

## 2025-01-01 ENCOUNTER — HOSPITAL ENCOUNTER (EMERGENCY)
Facility: HOSPITAL | Age: 40
End: 2025-01-31
Attending: STUDENT IN AN ORGANIZED HEALTH CARE EDUCATION/TRAINING PROGRAM

## 2025-01-01 DIAGNOSIS — I46.9 CARDIAC ARREST: Primary | ICD-10-CM

## 2025-01-01 LAB — POCT GLUCOSE: 156 MG/DL (ref 70–110)

## 2025-01-01 PROCEDURE — 82962 GLUCOSE BLOOD TEST: CPT

## 2025-01-01 PROCEDURE — 99900035 HC TECH TIME PER 15 MIN (STAT)

## 2025-01-01 PROCEDURE — 25000003 PHARM REV CODE 250: Performed by: STUDENT IN AN ORGANIZED HEALTH CARE EDUCATION/TRAINING PROGRAM

## 2025-01-01 PROCEDURE — 31500 INSERT EMERGENCY AIRWAY: CPT

## 2025-01-01 PROCEDURE — 63600175 PHARM REV CODE 636 W HCPCS: Performed by: STUDENT IN AN ORGANIZED HEALTH CARE EDUCATION/TRAINING PROGRAM

## 2025-01-01 PROCEDURE — 99291 CRITICAL CARE FIRST HOUR: CPT

## 2025-01-01 RX ORDER — NALOXONE HCL 0.4 MG/ML
0.4 VIAL (ML) INJECTION
Status: COMPLETED | OUTPATIENT
Start: 2025-01-01 | End: 2025-01-01

## 2025-01-01 RX ORDER — EPINEPHRINE 0.1 MG/ML
INJECTION INTRAVENOUS CODE/TRAUMA/SEDATION MEDICATION
Status: COMPLETED | OUTPATIENT
Start: 2025-01-01 | End: 2025-01-01

## 2025-01-01 RX ORDER — SODIUM BICARBONATE 1 MEQ/ML
SYRINGE (ML) INTRAVENOUS CODE/TRAUMA/SEDATION MEDICATION
Status: COMPLETED | OUTPATIENT
Start: 2025-01-01 | End: 2025-01-01

## 2025-01-01 RX ORDER — CALCIUM CHLORIDE INJECTION 100 MG/ML
INJECTION, SOLUTION INTRAVENOUS CODE/TRAUMA/SEDATION MEDICATION
Status: COMPLETED | OUTPATIENT
Start: 2025-01-01 | End: 2025-01-01

## 2025-01-01 RX ADMIN — CALCIUM CHLORIDE 1 G: 100 INJECTION, SOLUTION INTRAVENOUS at 07:01

## 2025-01-01 RX ADMIN — NALOXONE HYDROCHLORIDE 0.4 MG: 0.4 INJECTION, SOLUTION INTRAMUSCULAR; INTRAVENOUS; SUBCUTANEOUS at 07:01

## 2025-01-01 RX ADMIN — SODIUM BICARBONATE 50 MEQ: 84 INJECTION, SOLUTION INTRAVENOUS at 07:01

## 2025-01-01 RX ADMIN — EPINEPHRINE 1 MG: 0.1 INJECTION, SOLUTION ENDOTRACHEAL; INTRACARDIAC; INTRAVENOUS at 07:01
